# Patient Record
Sex: MALE | Race: ASIAN | NOT HISPANIC OR LATINO | URBAN - METROPOLITAN AREA
[De-identification: names, ages, dates, MRNs, and addresses within clinical notes are randomized per-mention and may not be internally consistent; named-entity substitution may affect disease eponyms.]

---

## 2024-08-03 ENCOUNTER — INPATIENT (INPATIENT)
Facility: HOSPITAL | Age: 79
LOS: 2 days | Discharge: HOME | End: 2024-08-06
Attending: INTERNAL MEDICINE | Admitting: INTERNAL MEDICINE
Payer: MEDICAID

## 2024-08-03 VITALS
WEIGHT: 166.23 LBS | SYSTOLIC BLOOD PRESSURE: 106 MMHG | HEIGHT: 70 IN | TEMPERATURE: 97 F | RESPIRATION RATE: 18 BRPM | OXYGEN SATURATION: 98 % | DIASTOLIC BLOOD PRESSURE: 70 MMHG | HEART RATE: 81 BPM

## 2024-08-03 LAB
ALBUMIN SERPL ELPH-MCNC: 3.9 G/DL — SIGNIFICANT CHANGE UP (ref 3.3–5)
ALP SERPL-CCNC: 61 U/L — SIGNIFICANT CHANGE UP (ref 40–120)
ALT FLD-CCNC: 27 U/L — SIGNIFICANT CHANGE UP (ref 12–78)
AMMONIA BLD-MCNC: 30 UMOL/L — SIGNIFICANT CHANGE UP (ref 11–32)
ANION GAP SERPL CALC-SCNC: 6 MMOL/L — SIGNIFICANT CHANGE UP (ref 5–17)
APPEARANCE UR: CLEAR — SIGNIFICANT CHANGE UP
APTT BLD: 23 SEC — LOW (ref 24.5–35.6)
AST SERPL-CCNC: 14 U/L — LOW (ref 15–37)
BASOPHILS # BLD AUTO: 0.03 K/UL — SIGNIFICANT CHANGE UP (ref 0–0.2)
BASOPHILS NFR BLD AUTO: 0.4 % — SIGNIFICANT CHANGE UP (ref 0–2)
BILIRUB SERPL-MCNC: 0.3 MG/DL — SIGNIFICANT CHANGE UP (ref 0.2–1.2)
BILIRUB UR-MCNC: NEGATIVE — SIGNIFICANT CHANGE UP
BLD GP AB SCN SERPL QL: SIGNIFICANT CHANGE UP
BUN SERPL-MCNC: 13 MG/DL — SIGNIFICANT CHANGE UP (ref 7–23)
CALCIUM SERPL-MCNC: 9 MG/DL — SIGNIFICANT CHANGE UP (ref 8.5–10.1)
CHLORIDE SERPL-SCNC: 109 MMOL/L — HIGH (ref 96–108)
CO2 SERPL-SCNC: 25 MMOL/L — SIGNIFICANT CHANGE UP (ref 22–31)
COLOR SPEC: YELLOW — SIGNIFICANT CHANGE UP
CREAT SERPL-MCNC: 1.18 MG/DL — SIGNIFICANT CHANGE UP (ref 0.5–1.3)
DIFF PNL FLD: NEGATIVE — SIGNIFICANT CHANGE UP
EGFR: 62 ML/MIN/1.73M2 — SIGNIFICANT CHANGE UP
EOSINOPHIL # BLD AUTO: 0.24 K/UL — SIGNIFICANT CHANGE UP (ref 0–0.5)
EOSINOPHIL NFR BLD AUTO: 3.2 % — SIGNIFICANT CHANGE UP (ref 0–6)
ETHANOL SERPL-MCNC: <10 MG/DL — SIGNIFICANT CHANGE UP (ref 0–10)
GLUCOSE SERPL-MCNC: 126 MG/DL — HIGH (ref 70–99)
GLUCOSE UR QL: NEGATIVE MG/DL — SIGNIFICANT CHANGE UP
HCT VFR BLD CALC: 38 % — LOW (ref 39–50)
HGB BLD-MCNC: 13 G/DL — SIGNIFICANT CHANGE UP (ref 13–17)
IMM GRANULOCYTES NFR BLD AUTO: 0.1 % — SIGNIFICANT CHANGE UP (ref 0–0.9)
INR BLD: 0.85 RATIO — SIGNIFICANT CHANGE UP (ref 0.85–1.18)
KETONES UR-MCNC: NEGATIVE MG/DL — SIGNIFICANT CHANGE UP
LACTATE SERPL-SCNC: 1.2 MMOL/L — SIGNIFICANT CHANGE UP (ref 0.7–2)
LEUKOCYTE ESTERASE UR-ACNC: NEGATIVE — SIGNIFICANT CHANGE UP
LYMPHOCYTES # BLD AUTO: 3.82 K/UL — HIGH (ref 1–3.3)
LYMPHOCYTES # BLD AUTO: 51.7 % — HIGH (ref 13–44)
MCHC RBC-ENTMCNC: 29.2 PG — SIGNIFICANT CHANGE UP (ref 27–34)
MCHC RBC-ENTMCNC: 34.2 G/DL — SIGNIFICANT CHANGE UP (ref 32–36)
MCV RBC AUTO: 85.4 FL — SIGNIFICANT CHANGE UP (ref 80–100)
MONOCYTES # BLD AUTO: 0.58 K/UL — SIGNIFICANT CHANGE UP (ref 0–0.9)
MONOCYTES NFR BLD AUTO: 7.8 % — SIGNIFICANT CHANGE UP (ref 2–14)
NEUTROPHILS # BLD AUTO: 2.71 K/UL — SIGNIFICANT CHANGE UP (ref 1.8–7.4)
NEUTROPHILS NFR BLD AUTO: 36.8 % — LOW (ref 43–77)
NITRITE UR-MCNC: NEGATIVE — SIGNIFICANT CHANGE UP
NRBC # BLD: 0 /100 WBCS — SIGNIFICANT CHANGE UP (ref 0–0)
PH UR: 6 — SIGNIFICANT CHANGE UP (ref 5–8)
PLATELET # BLD AUTO: 198 K/UL — SIGNIFICANT CHANGE UP (ref 150–400)
POTASSIUM SERPL-MCNC: 3.6 MMOL/L — SIGNIFICANT CHANGE UP (ref 3.5–5.3)
POTASSIUM SERPL-SCNC: 3.6 MMOL/L — SIGNIFICANT CHANGE UP (ref 3.5–5.3)
PROT SERPL-MCNC: 7.4 GM/DL — SIGNIFICANT CHANGE UP (ref 6–8.3)
PROT UR-MCNC: NEGATIVE MG/DL — SIGNIFICANT CHANGE UP
PROTHROM AB SERPL-ACNC: 10.2 SEC — SIGNIFICANT CHANGE UP (ref 9.5–13)
RBC # BLD: 4.45 M/UL — SIGNIFICANT CHANGE UP (ref 4.2–5.8)
RBC # FLD: 13.2 % — SIGNIFICANT CHANGE UP (ref 10.3–14.5)
SODIUM SERPL-SCNC: 140 MMOL/L — SIGNIFICANT CHANGE UP (ref 135–145)
SP GR SPEC: >1.03 — HIGH (ref 1–1.03)
TROPONIN I, HIGH SENSITIVITY RESULT: 4.9 NG/L — SIGNIFICANT CHANGE UP
UROBILINOGEN FLD QL: 1 MG/DL — SIGNIFICANT CHANGE UP (ref 0.2–1)
WBC # BLD: 7.39 K/UL — SIGNIFICANT CHANGE UP (ref 3.8–10.5)
WBC # FLD AUTO: 7.39 K/UL — SIGNIFICANT CHANGE UP (ref 3.8–10.5)

## 2024-08-03 PROCEDURE — 99291 CRITICAL CARE FIRST HOUR: CPT

## 2024-08-03 PROCEDURE — 99222 1ST HOSP IP/OBS MODERATE 55: CPT

## 2024-08-03 PROCEDURE — 93010 ELECTROCARDIOGRAM REPORT: CPT

## 2024-08-03 PROCEDURE — 70496 CT ANGIOGRAPHY HEAD: CPT | Mod: 26,MC

## 2024-08-03 PROCEDURE — 0042T: CPT | Mod: MC

## 2024-08-03 PROCEDURE — 70498 CT ANGIOGRAPHY NECK: CPT | Mod: 26,MC

## 2024-08-03 RX ORDER — ACETAMINOPHEN 500 MG
650 TABLET ORAL ONCE
Refills: 0 | Status: COMPLETED | OUTPATIENT
Start: 2024-08-03 | End: 2024-08-03

## 2024-08-03 RX ORDER — BACTERIOSTATIC SODIUM CHLORIDE 0.9 %
1000 VIAL (ML) INJECTION
Refills: 0 | Status: DISCONTINUED | OUTPATIENT
Start: 2024-08-03 | End: 2024-08-04

## 2024-08-03 RX ORDER — CLOSTRIDIUM TETANI TOXOID ANTIGEN (FORMALDEHYDE INACTIVATED), CORYNEBACTERIUM DIPHTHERIAE TOXOID ANTIGEN (FORMALDEHYDE INACTIVATED), BORDETELLA PERTUSSIS TOXOID ANTIGEN (GLUTARALDEHYDE INACTIVATED), BORDETELLA PERTUSSIS FILAMENTOUS HEMAGGLUTININ ANTIGEN (FORMALDEHYDE INACTIVATED), BORDETELLA PERTUSSIS PERTACTIN ANTIGEN, AND BORDETELLA PERTUSSIS FIMBRIAE 2/3 ANTIGEN 5; 2; 2.5; 5; 3; 5 [LF]/.5ML; [LF]/.5ML; UG/.5ML; UG/.5ML; UG/.5ML; UG/.5ML
0.5 INJECTION, SUSPENSION INTRAMUSCULAR ONCE
Refills: 0 | Status: COMPLETED | OUTPATIENT
Start: 2024-08-03 | End: 2024-08-03

## 2024-08-03 RX ADMIN — CLOSTRIDIUM TETANI TOXOID ANTIGEN (FORMALDEHYDE INACTIVATED), CORYNEBACTERIUM DIPHTHERIAE TOXOID ANTIGEN (FORMALDEHYDE INACTIVATED), BORDETELLA PERTUSSIS TOXOID ANTIGEN (GLUTARALDEHYDE INACTIVATED), BORDETELLA PERTUSSIS FILAMENTOUS HEMAGGLUTININ ANTIGEN (FORMALDEHYDE INACTIVATED), BORDETELLA PERTUSSIS PERTACTIN ANTIGEN, AND BORDETELLA PERTUSSIS FIMBRIAE 2/3 ANTIGEN 0.5 MILLILITER(S): 5; 2; 2.5; 5; 3; 5 INJECTION, SUSPENSION INTRAMUSCULAR at 20:50

## 2024-08-03 RX ADMIN — Medication 60 MILLILITER(S): at 22:36

## 2024-08-03 RX ADMIN — Medication 650 MILLIGRAM(S): at 22:44

## 2024-08-03 RX ADMIN — Medication 650 MILLIGRAM(S): at 23:44

## 2024-08-03 NOTE — H&P ADULT - ASSESSMENT
78 yo male had syncopal event about 5 minutes before coming to ER.  Pt. was upright standing outside in his back yard, started to feel R sided chest pain, then collapsed to floor, unconscious for 2 minutes, no shaking.  Event witnessed by family--children at bedside provide history.   Family also state that he's been having LUE pain for a couple of days, but no numbness or weakness.  Pt. was drowsy after falling.  Pt. is still slow to respond to questions at this time, and has slightly slurred speech per family (speaking Jody), but not answering appropriately.  They note he seems weaker than usual--normally physically active, and he was gardening earlier in the day without complaints.  Pt. has a posterior HA after falling, and abrasion to back of head, as he hit his head on the ground when he fell back.  No other injuries, no other complaints at this time.        Plan:  Admit to tele for syncope eval.  EKG is NSR, no ST changes, no Q waves, all normal intervals. Trop normal, CBC and SMA-7 all WNL.     CT head and CTA no acute infarct or bleed. Glucose 126, no DM. Lactate normal at 1.2, no infections, creatinine normal at 1.18. Repeat trop in AM.     Will get TTE eval EF and r/o AS, thyroid sono, follow up to CT neck. TSH and lipid panel in AM.     Monitor on tele for 48 hours for arrhythmia.  78 yo male had syncopal event about 5 minutes before coming to ER.  Pt. was upright standing outside in his back yard, started to feel R sided chest pain, then collapsed to floor, unconscious for 2 minutes, no shaking.  Event witnessed by family--children at bedside provide history.   Family also state that he's been having LUE pain for a couple of days, but no numbness or weakness.  Pt. was drowsy after falling.  Pt. is still slow to respond to questions at this time, and has slightly slurred speech per family (speaking Jody), but not answering appropriately.  They note he seems weaker than usual--normally physically active, and he was gardening earlier in the day without complaints.  Pt. has a posterior HA after falling, and abrasion to back of head, as he hit his head on the ground when he fell back.  No other injuries, no other complaints at this time.        Plan:  Admit to tele for syncope eval.  EKG is NSR, no ST changes, no Q waves, all normal intervals. Trop normal, CBC and SMA-7 all WNL.     CT head and CTA no acute infarct or bleed. Glucose 126, no DM. Lactate normal at 1.2, no infections, creatinine normal at 1.18. Repeat trop in AM.     Will get TTE eval EF and r/o AS, thyroid sono, follow up to CT neck. TSH and lipid panel in AM.  IVF for 24 hours, orthostasis in AM.     Takes no routine meds at home.     Monitor on tele for 48 hours for arrhythmia.

## 2024-08-03 NOTE — ED ADULT NURSE NOTE - OBJECTIVE STATEMENT
79Y MPMH HLD, HTN (not controlled by medication), Jody-speaking (joined by grandchildren at bedside, used for translation) c/o syncopal episode approx. 5 minutes prior to arrival to ED. per granddaughter, pt was gardening outside and decided to go for a walk at the park, states pt began feeling lightheaded/dizzy and developed R-sided chest pain; pt collapsed shortly afterwards, hitting back of head. pt wearing turban at the time of his fall. in ED, pt appears drowsy and diaphoretic, slow to respond to questions; per family, this is not pt's baseline. abrasion noted to L posterior head, some bleeding at site. code stroke initiated to r/o stroke. nkda

## 2024-08-03 NOTE — H&P ADULT - NSHPPHYSICALEXAM_GEN_ALL_CORE
PHYSICAL EXAMINATION:  Vital Signs Last 24 Hrs  T(C): 36.3 (03 Aug 2024 19:51), Max: 36.3 (03 Aug 2024 19:51)  T(F): 97.4 (03 Aug 2024 19:51), Max: 97.4 (03 Aug 2024 19:51)  HR: 81 (03 Aug 2024 19:51) (81 - 81)  BP: 106/70 (03 Aug 2024 19:51) (106/70 - 106/70)  BP(mean): --  RR: 18 (03 Aug 2024 19:51) (18 - 18)  SpO2: 98% (03 Aug 2024 19:51) (98% - 98%)      CAPILLARY BLOOD GLUCOSE      POCT Blood Glucose.: 125 mg/dL (03 Aug 2024 19:52)      GENERAL: NAD,   ENMT: mucous membranes moist  NECK: supple, No JVD  CHEST/LUNG: clear to auscultation bilaterally; no rales, rhonchi, or wheezing b/l  HEART: normal S1, S2  ABDOMEN: BS+, soft, ND, NT   EXTREMITIES:  pulses palpable; no clubbing, cyanosis, or edema b/l LEs  NEURO: awake, alert, interactive; moves all extremities PHYSICAL EXAMINATION:  Vital Signs Last 24 Hrs  T(C): 36.3 (03 Aug 2024 19:51), Max: 36.3 (03 Aug 2024 19:51)  T(F): 97.4 (03 Aug 2024 19:51), Max: 97.4 (03 Aug 2024 19:51)  HR: 81 (03 Aug 2024 19:51) (81 - 81)  BP: 106/70 (03 Aug 2024 19:51) (106/70 - 106/70)  BP(mean): --  RR: 18 (03 Aug 2024 19:51) (18 - 18)  SpO2: 98% (03 Aug 2024 19:51) (98% - 98%)      CAPILLARY BLOOD GLUCOSE      POCT Blood Glucose.: 125 mg/dL (03 Aug 2024 19:52)      GENERAL: NAD, seen in ER, comfortable, no CP or SOB, no fevers, grandaughter at bedside.    ENMT: mucous membranes moist  NECK: supple, No JVD  CHEST/LUNG: clear to auscultation bilaterally; no rales, rhonchi, or wheezing b/l  HEART: normal S1, S2  ABDOMEN: BS+, soft, ND, NT   EXTREMITIES:  pulses palpable; no clubbing, cyanosis, or edema b/l LEs  NEURO: awake, alert, interactive; moves all extremities

## 2024-08-03 NOTE — ED PROVIDER NOTE - OBJECTIVE STATEMENT
Pt. prefers that family translate Jody at bedside:  78 yo M with syncopal event about 5 minutes before coming to ER.  Pt. was upright standing outside, started to feel R sided chest pain, then collapsed to floor, unconscious for 2 minutes, no shaking.  Event witnessed by family--children at bedside provide history.   Family also state that he's been having LUE pain for a couple of days, but no numbness or weakness.  Pt. was drowsy after falling.  Pt. is still slow to respond to questions at this time, and has slightly slurred speech per family (speaking Jody), but not answering appropriately.  They note he seems weaker than usual--normally physically active, and he was gardening earlier in the day without complaints.  Pt. has a posterior HA after falling, and abrasion to back of head, as he hit his head on the ground when he fell back.  No other injuries, no other complaints at this time.    ROS: negative for fever, cough, shortness of breath, abd pain, nausea, vomiting, diarrhea, rash, paresthesia, and weakness--all other systems reviewed are negative.   PMH: negative (family admit pt. does not follow regularly with doctors); Meds: Denies; SH: Denies smoking/drinking/drug use

## 2024-08-03 NOTE — H&P ADULT - NSHPLABSRESULTS_GEN_ALL_CORE
LABS:                        13.0   7.39  )-----------( 198      ( 03 Aug 2024 20:03 )             38.0     08-03    140  |  109<H>  |  13  ----------------------------<  126<H>  3.6   |  25  |  1.18    Ca    9.0      03 Aug 2024 20:03    TPro  7.4  /  Alb  3.9  /  TBili  0.3  /  DBili  x   /  AST  14<L>  /  ALT  27  /  AlkPhos  61  08-03    PT/INR - ( 03 Aug 2024 20:03 )   PT: 10.2 sec;   INR: 0.85 ratio         PTT - ( 03 Aug 2024 20:03 )  PTT:23.0 sec  Urinalysis Basic - ( 03 Aug 2024 20:03 )    Color: x / Appearance: x / SG: x / pH: x  Gluc: 126 mg/dL / Ketone: x  / Bili: x / Urobili: x   Blood: x / Protein: x / Nitrite: x   Leuk Esterase: x / RBC: x / WBC x   Sq Epi: x / Non Sq Epi: x / Bacteria: x          RADIOLOGY & ADDITIONAL TESTS:

## 2024-08-03 NOTE — ED PROVIDER NOTE - CLINICAL SUMMARY MEDICAL DECISION MAKING FREE TEXT BOX
78 yo M with R sided chest pain, syncopal event, head trauma with posterior head abrasion, no focal weakness at this time to suggest stroke, doubt PE, ACS is a concern but less likely given right sided pain, no other evidence for bodily injury  -cbc, cmp, coags, ammonia, ua/cx, dimer, lactate, lipid profile, trop, type and screen, CTA head/neck, CT brain, EKG, IV, monitor, adacel booster for abrasion  -f/u results, reeval

## 2024-08-03 NOTE — H&P ADULT - HISTORY OF PRESENT ILLNESS
Pt. prefers that family translate Jody at bedside:    78 yo male had syncopal event about 5 minutes before coming to ER.  Pt. was upright standing outside, started to feel R sided chest pain, then collapsed to floor, unconscious for 2 minutes, no shaking.  Event witnessed by family--children at bedside provide history.   Family also state that he's been having LUE pain for a couple of days, but no numbness or weakness.  Pt. was drowsy after falling.  Pt. is still slow to respond to questions at this time, and has slightly slurred speech per family (speaking Jody), but not answering appropriately.  They note he seems weaker than usual--normally physically active, and he was gardening earlier in the day without complaints.  Pt. has a posterior HA after falling, and abrasion to back of head, as he hit his head on the ground when he fell back.  No other injuries, no other complaints at this time.   Pt. prefers that family translate Jody at bedside: Granddaughter was at bedside.     80 yo male had syncopal event about 5 minutes before coming to ER.  Pt. was upright standing outside in his back yard, started to feel R sided chest pain, then collapsed to floor, unconscious for 2 minutes, no shaking.  Event witnessed by family--children at bedside provide history.   Family also state that he's been having LUE pain for a couple of days, but no numbness or weakness.  Pt. was drowsy after falling.  Pt. is still slow to respond to questions at this time, and has slightly slurred speech per family (speaking Jody), but not answering appropriately.  They note he seems weaker than usual--normally physically active, and he was gardening earlier in the day without complaints.  Pt. has a posterior HA after falling, and abrasion to back of head, as he hit his head on the ground when he fell back.  No other injuries, no other complaints at this time.

## 2024-08-03 NOTE — ED PROVIDER NOTE - PROGRESS NOTE DETAILS
pt. comfortable, more alert, labs and imaging unremarkable thus far--age-adjusted dimer is negative and pt. has no sob to suggest PE at this time  will admit for syncope, endorsed to Dr. Becerril for admission

## 2024-08-03 NOTE — ED ADULT NURSE NOTE - NSFALLRISKINTERV_ED_ALL_ED
Assistance OOB with selected safe patient handling equipment if applicable/Communicate fall risk and risk factors to all staff, patient, and family/Orthostatic vital signs/Provide visual cue: yellow wristband, yellow gown, etc/Reinforce activity limits and safety measures with patient and family/Call bell, personal items and telephone in reach/Instruct patient to call for assistance before getting out of bed/chair/stretcher/Non-slip footwear applied when patient is off stretcher/[place] to call system/Physically safe environment - no spills, clutter or unnecessary equipment/Purposeful Proactive Rounding/Room/bathroom lighting operational, light cord in reach

## 2024-08-03 NOTE — ED ADULT NURSE NOTE - ED STAT RN HANDOFF DETAILS
report given to Greta MONTILLA. pt sleeping at this time, rise and fall of chest observed. joined by grandson at bedside. belongings secured with family members. pt with NS infusing via 20G R AC access, infusing well/tolerated well. VSS, no acute distress noted.

## 2024-08-03 NOTE — ED PROVIDER NOTE - PHYSICAL EXAMINATION
Vitals: WNL  Gen: AAOx3, NAD, sitting comfortably in stretcher, non-toxic, responsive to questions   Head: 2x3 cm abrasions to posterior scalp, otherwise ncat, perrla, eomi b/l  Neck: supple, no lymphadenopathy, no midline deviation  Heart: rrr, no m/r/g  Lungs: CTA b/l, no rales/ronchi/wheezes  Abd: soft, nontender, non-distended, no rebound or guarding  Ext: no clubbing/cyanosis/edema  Neuro: sensation and muscle strength intact b/l, no focal weakness or sensory loss, CN2-12 intact b/l

## 2024-08-03 NOTE — ED ADULT TRIAGE NOTE - CHIEF COMPLAINT QUOTE
per family pt was standing and fell flat back and hit back of head, no noted blood on back of head. family states he was "staring into space" for appoximately 3 minutes.  family states his L-arm has been hurting x 2 days. possibly undigasnosed HTN, HLD without meds.   in triage. Dr. Horowitz in triage,  code stroke called @ 1957.

## 2024-08-04 LAB
CHOLEST SERPL-MCNC: 215 MG/DL — HIGH
CHOLEST SERPL-MCNC: 239 MG/DL — HIGH
HDLC SERPL-MCNC: 28 MG/DL — LOW
HDLC SERPL-MCNC: 29 MG/DL — LOW
LIPID PNL WITH DIRECT LDL SERPL: 157 MG/DL — HIGH
LIPID PNL WITH DIRECT LDL SERPL: 168 MG/DL — HIGH
NON HDL CHOLESTEROL: 188 MG/DL — HIGH
NON HDL CHOLESTEROL: 210 MG/DL — HIGH
TRIGL SERPL-MCNC: 166 MG/DL — HIGH
TRIGL SERPL-MCNC: 222 MG/DL — HIGH
TROPONIN I, HIGH SENSITIVITY RESULT: 10.4 NG/L — SIGNIFICANT CHANGE UP
TSH SERPL-MCNC: 2.34 UIU/ML — SIGNIFICANT CHANGE UP (ref 0.36–3.74)

## 2024-08-04 PROCEDURE — 76536 US EXAM OF HEAD AND NECK: CPT | Mod: 26

## 2024-08-04 PROCEDURE — 99232 SBSQ HOSP IP/OBS MODERATE 35: CPT

## 2024-08-04 PROCEDURE — 73030 X-RAY EXAM OF SHOULDER: CPT | Mod: 26,RT

## 2024-08-04 RX ORDER — IBUPROFEN 200 MG
400 TABLET ORAL THREE TIMES A DAY
Refills: 0 | Status: DISCONTINUED | OUTPATIENT
Start: 2024-08-04 | End: 2024-08-05

## 2024-08-04 RX ORDER — ACETAMINOPHEN 500 MG
1000 TABLET ORAL ONCE
Refills: 0 | Status: COMPLETED | OUTPATIENT
Start: 2024-08-04 | End: 2024-08-04

## 2024-08-04 RX ADMIN — Medication 400 MILLIGRAM(S): at 05:36

## 2024-08-04 RX ADMIN — Medication 1000 MILLIGRAM(S): at 06:17

## 2024-08-04 RX ADMIN — Medication 400 MILLIGRAM(S): at 18:03

## 2024-08-04 RX ADMIN — Medication 400 MILLIGRAM(S): at 19:03

## 2024-08-04 NOTE — PROGRESS NOTE ADULT - SUBJECTIVE AND OBJECTIVE BOX
INTERVAL HPI/OVERNIGHT EVENTS:  Pt seen and examined at bedside.     Allergies/Intolerance: No Known Allergies      MEDICATIONS  (STANDING):  sodium chloride 0.9%. 1000 milliLiter(s) (60 mL/Hr) IV Continuous <Continuous>    MEDICATIONS  (PRN):        ROS: all systems reviewed and wnl      PHYSICAL EXAMINATION:  Vital Signs Last 24 Hrs  T(C): 36.9 (04 Aug 2024 12:05), Max: 37.1 (04 Aug 2024 07:59)  T(F): 98.4 (04 Aug 2024 12:05), Max: 98.7 (04 Aug 2024 07:59)  HR: 65 (04 Aug 2024 12:05) (63 - 81)  BP: 131/72 (04 Aug 2024 12:05) (106/70 - 149/95)  BP(mean): --  RR: 16 (04 Aug 2024 12:05) (15 - 18)  SpO2: 99% (04 Aug 2024 12:05) (98% - 100%)    Parameters below as of 04 Aug 2024 07:59  Patient On (Oxygen Delivery Method): room air      CAPILLARY BLOOD GLUCOSE      POCT Blood Glucose.: 125 mg/dL (03 Aug 2024 19:52)        GENERAL:   NECK: supple, No JVD  CHEST/LUNG: clear to auscultation bilaterally; no rales, rhonchi, or wheezing b/l  HEART: normal S1, S2  ABDOMEN: BS+, soft, ND, NT   EXTREMITIES:  pulses palpable; no clubbing, cyanosis, or edema b/l LEs  SKIN: no rashes or lesions      LABS:                        13.0   7.39  )-----------( 198      ( 03 Aug 2024 20:03 )             38.0     08-03    140  |  109<H>  |  13  ----------------------------<  126<H>  3.6   |  25  |  1.18    Ca    9.0      03 Aug 2024 20:03    TPro  7.4  /  Alb  3.9  /  TBili  0.3  /  DBili  x   /  AST  14<L>  /  ALT  27  /  AlkPhos  61  08-03    PT/INR - ( 03 Aug 2024 20:03 )   PT: 10.2 sec;   INR: 0.85 ratio         PTT - ( 03 Aug 2024 20:03 )  PTT:23.0 sec  Urinalysis Basic - ( 03 Aug 2024 22:28 )    Color: Yellow / Appearance: Clear / SG: >1.030 / pH: x  Gluc: x / Ketone: Negative mg/dL  / Bili: Negative / Urobili: 1.0 mg/dL   Blood: x / Protein: Negative mg/dL / Nitrite: Negative   Leuk Esterase: Negative / RBC: x / WBC x   Sq Epi: x / Non Sq Epi: x / Bacteria: x             INTERVAL HPI/OVERNIGHT EVENTS:  Pt seen and examined at bedside.     Allergies/Intolerance: No Known Allergies      MEDICATIONS  (STANDING):  sodium chloride 0.9%. 1000 milliLiter(s) (60 mL/Hr) IV Continuous <Continuous>    MEDICATIONS  (PRN):        ROS: all systems reviewed and wnl      PHYSICAL EXAMINATION:  Vital Signs Last 24 Hrs  T(C): 36.9 (04 Aug 2024 12:05), Max: 37.1 (04 Aug 2024 07:59)  T(F): 98.4 (04 Aug 2024 12:05), Max: 98.7 (04 Aug 2024 07:59)  HR: 65 (04 Aug 2024 12:05) (63 - 81)  BP: 131/72 (04 Aug 2024 12:05) (106/70 - 149/95)  BP(mean): --  RR: 16 (04 Aug 2024 12:05) (15 - 18)  SpO2: 99% (04 Aug 2024 12:05) (98% - 100%)    Parameters below as of 04 Aug 2024 07:59  Patient On (Oxygen Delivery Method): room air      CAPILLARY BLOOD GLUCOSE      POCT Blood Glucose.: 125 mg/dL (03 Aug 2024 19:52)        GENERAL: mild right shoulder pain   NECK: supple, No JVD  CHEST/LUNG: clear to auscultation bilaterally; no rales, rhonchi, or wheezing b/l  HEART: normal S1, S2  ABDOMEN: BS+, soft, ND, NT   EXTREMITIES:  pulses palpable; no clubbing, cyanosis, or edema b/l LEs  SKIN: no rashes or lesions      LABS:                        13.0   7.39  )-----------( 198      ( 03 Aug 2024 20:03 )             38.0     08-03    140  |  109<H>  |  13  ----------------------------<  126<H>  3.6   |  25  |  1.18    Ca    9.0      03 Aug 2024 20:03    TPro  7.4  /  Alb  3.9  /  TBili  0.3  /  DBili  x   /  AST  14<L>  /  ALT  27  /  AlkPhos  61  08-03    PT/INR - ( 03 Aug 2024 20:03 )   PT: 10.2 sec;   INR: 0.85 ratio         PTT - ( 03 Aug 2024 20:03 )  PTT:23.0 sec  Urinalysis Basic - ( 03 Aug 2024 22:28 )    Color: Yellow / Appearance: Clear / SG: >1.030 / pH: x  Gluc: x / Ketone: Negative mg/dL  / Bili: Negative / Urobili: 1.0 mg/dL   Blood: x / Protein: Negative mg/dL / Nitrite: Negative   Leuk Esterase: Negative / RBC: x / WBC x   Sq Epi: x / Non Sq Epi: x / Bacteria: x

## 2024-08-04 NOTE — PROGRESS NOTE ADULT - ASSESSMENT
78 yo male had syncopal event about 5 minutes before coming to ER.  Pt. was upright standing outside in his back yard, started to feel R sided chest pain, then collapsed to floor, unconscious for 2 minutes, no shaking.  Event witnessed by family--children at bedside provide history.   Family also state that he's been having LUE pain for a couple of days, but no numbness or weakness.  Pt. was drowsy after falling.  Pt. is still slow to respond to questions at this time, and has slightly slurred speech per family (speaking Jody), but not answering appropriately.  They note he seems weaker than usual--normally physically active, and he was gardening earlier in the day without complaints.  Pt. has a posterior HA after falling, and abrasion to back of head, as he hit his head on the ground when he fell back.  No other injuries, no other complaints at this time.        Plan:  Admit to tele for syncope eval.  EKG is NSR, no ST changes, no Q waves, all normal intervals. Trop normal, CBC and SMA-7 all WNL.     CT head and CTA no acute infarct or bleed. Glucose 126, no DM. Lactate normal at 1.2, no infections, creatinine normal at 1.18. Repeat trop in AM.     Will get TTE eval EF and r/o AS, thyroid sono, follow up to CT neck. TSH and lipid panel in AM.  IVF for 24 hours, orthostasis in AM.     Takes no routine meds at home.     Monitor on tele for 48 hours for arrhythmia.  78 yo male had syncopal event about 5 minutes before coming to ER.  Pt. was upright standing outside in his back yard, started to feel R sided chest pain, then collapsed to floor, unconscious for 2 minutes, no shaking.  Event witnessed by family--children at bedside provide history.   Family also state that he's been having LUE pain for a couple of days, but no numbness or weakness.  Pt. was drowsy after falling.  Pt. is still slow to respond to questions at this time, and has slightly slurred speech per family (speaking Jody), but not answering appropriately.  They note he seems weaker than usual--normally physically active, and he was gardening earlier in the day without complaints.  Pt. has a posterior HA after falling, and abrasion to back of head, as he hit his head on the ground when he fell back.  No other injuries, no other complaints at this time.        Plan:  Admit to tele for syncope eval.  EKG is NSR, no ST changes, no Q waves, all normal intervals. Trop normal x 2. CBC and SMA-7 all WNL.     CT head and CTA no acute infarct or bleed. Glucose 126, no DM. Lactate normal at 1.2, no infections, creatinine normal at 1.18.      Will get TTE eval EF and r/o AS, thyroid sono, follow up to CT neck. TSH and lipid panel in AM.  IVF can stop.      Takes no routine meds at home.     Monitor on tele for 48 hours for arrhythmia.     Needs TTE in AM, syncope unexplained.  80 yo male had syncopal event about 5 minutes before coming to ER.  Pt. was upright standing outside in his back yard, started to feel R sided chest pain, then collapsed to floor, unconscious for 2 minutes, no shaking.  Event witnessed by family--children at bedside provide history.   Family also state that he's been having LUE pain for a couple of days, but no numbness or weakness.  Pt. was drowsy after falling.  Pt. is still slow to respond to questions at this time, and has slightly slurred speech per family (speaking Jody), but not answering appropriately.  They note he seems weaker than usual--normally physically active, and he was gardening earlier in the day without complaints.  Pt. has a posterior HA after falling, and abrasion to back of head, as he hit his head on the ground when he fell back.  No other injuries, no other complaints at this time.        Plan:  Admit to tele for syncope eval.  EKG is NSR, no ST changes, no Q waves, all normal intervals. Trop normal x 2. CBC and SMA-7 all WNL.     CT head and CTA no acute infarct or bleed. Glucose 126, no DM. Lactate normal at 1.2, no infections, creatinine normal at 1.18.      Will get TTE eval EF and r/o AS, thyroid sono, follow up to CT neck. TSH and lipid panel in AM.  IVF can stop.      Takes no routine meds at home.     Right shoulder X-ray ordered, had fall with syncope.     Motrin tid for 2 days for muscle pain after fall.      Monitor on tele for 48 hours for arrhythmia.     Needs TTE in AM, syncope unexplained.

## 2024-08-04 NOTE — PATIENT PROFILE ADULT - FALL HARM RISK - HARM RISK INTERVENTIONS

## 2024-08-04 NOTE — PATIENT PROFILE ADULT - FUNCTIONAL ASSESSMENT - BASIC MOBILITY ASSESSMENT TYPE
ONCOLOGY / HEMATOLOGY FOLLOW  UP NOTE    Ms. Debra Jordan was seen at [facility], she is a 68 year old female with:    ONCOLOGY PROBLEMS:  Patient Active Problem List   Diagnosis   • Reflux esophagitis   • Hyperlipidemia, mixed   • Dueñas's esophagus   • Class 2 severe obesity due to excess calories with serious comorbidity and body mass index (BMI) of 37.0 to 37.9 in adult (CMD)   • Post-thrombotic syndrome   • Hypertensive kidney disease with stage 3a chronic kidney disease (CMD)   • Impaired fasting glucose   • Personal history of colonic polyps   • History of deep venous thrombosis (DVT) entire venous system left lower extremity excluding greater saphenous   • Malignant neoplasm of upper-outer quadrant of right breast in female, estrogen receptor positive (CMD)   • Tubular adenoma of colon   • Postoperative visit   • Long term current use of anticoagulant   • Type 2 diabetes mellitus without complication, without long-term current use of insulin (CMD)        Chief Complaint   Patient presents with   • Breast Cancer         INTERVAL HISTORY:  Ms. Debra Jordan is here for a follow up appointment.  Since the last visit she has continued to remain active.  Patient will be leaving for [place].  She is noted worsening body aches but denies any hot flashes or night sweats.  She does perform self-breast examination once a week and has no concerns.  She does take vitamin-D daily but can not recall the dose.  Please see review of systems below and the positive findings are highlighted.    PAST MEDICAL HISTORY:    Reviewed in patient's Epic chart.    FAMILY HISTORY:  Reviewed in patient's Epic chart.    Social History     Tobacco Use   Smoking Status Never   • Passive exposure: Never   Smokeless Tobacco Never     REVIEW OF SYSTEMS  GENERAL:  Patient denies headache, fevers, chills, night sweats, excessive fatigue, change in appetite, weight loss, dizziness, but complains of: increased fatigue.  Appetite good, wt stable.   ALLERGIC/IMMUNOLOGIC: Verified allergies: Yes  EYES:  Patient denies significant visual difficulties, double vision, blurred vision  ENT/MOUTH: Patient denies problems with hearing, sore throat, sinus drainage, mouth sores  ENDOCRINE:  Patient denies diabetes, thyroid disease, hormone replacement, hot flashes  HEMATOLOGIC/LYMPHATIC: Patient denies easy bruising, bleeding, tender lymph nodes, swollen lymph nodes  BREASTS: Patient denies abnormal masses of breast, nipple discharge, pain  RESPIRATORY:  Patient denies lung pain with breathing, cough, coughing up blood, shortness of breath  CARDIOVASCULAR:  Patient denies anginal chest pain, palpitations, shortness of breath when lying flat, peripheral edema  GASTROINTESTINAL: Patient denies abdominal pain , nausea, vomiting, GI bleeding, constipation, change in bowel habits, sensation of feeling full, difficulty swallowing, but complains of: diarrhea and heartburn  : Patient denies abnormal genital masses, blood in the urine, frequency, urgency, burning with urination, hesitancy, incontinence, vaginal bleeding, discharge  MUSCULOSKELETAL:  Patient denies joint pain, bone pain, redness, decreased range of motion, but complains of: joint swelling left leg  and pt relates many \"achy joints\".   SKIN:  Patient denies chronic rashes, inflammation, ulcerations, skin changes, itching  NEUROLOGIC:  Patient denies loss of balance, areas of focal weakness, abnormal gait, sensory problems, tingling, but complains of: numbness hands at night and left leg   PSYCHIATRIC: Patient denies depression, anxiety, but complains of: insomnia-recently started Trazadone.     PHYSICAL EXAMINATION:    Debra Jordan is a 68 year old female who is alert, oriented times 3, in no apparent distress.  VITALS:    Visit Vitals  /74   Pulse (!) 60   Temp 97.9 °F (36.6 °C) (Oral)   Resp 18   Wt 93.9 kg (207 lb) Comment: last wt 207   LMP 01/01/2006 (Approximate)   SpO2  96%   BMI 37.86 kg/m²      ECO  HEENT:  Anicteric sclera  NECK:  Supple with no cervical or supraclavicular adenopathy.  No palpable masses.  LUNGS:  Clear to auscultation bilaterally   CARDIOVASCULAR:  Regular rate and rhythm.  No S3, S4 or any murmurs.  BREASTS:  Right breast:  Right partial mastectomy with sentinel node biopsy and radiation changes.  There is area of induration in the scar with no palpable axillary lymphadenopathy.  There is no definite masses palpable.  Left breast:  Skin and nipple normal.  No palpable masses.  No axillary lymphadenopathy  ABDOMEN:  Soft, nontender, no hepatosplenomegaly.  Bowel sounds are normal.  No abnormal masses are palpable.  EXTREMITIES:  Trace edema of lower extremities.  NEUROLOGIC:  No focal deficit.  LYMPHATIC SYSTEM:  No cervical, supraclavicular or axillary lymphadenopathy.    LABS:  Recent Labs   Lab 23  1407 05/15/23  0951 22  1028   WBC 5.6 5.0 5.5   RBC 4.60 4.47 4.91   HGB 13.7 13.2 14.0   HCT 40.3 38.7 41.8   MCV 87.6 86.6 85.1    160 179   Absolute Neutrophils 3.5 3.6 3.8   Absolute Lymphocytes 1.6 1.0 1.2   Absolute Monocytes 0.4 0.3 0.3   Absolute Eosinophils  0.1 0.1 0.1   Absolute Basophils 0.0 0.0 0.1       Recent Labs   Lab 23  1407 23  0812 05/15/23  0951 23  0944 23  1033 22  1028   Glucose 148* 181* 167* 166*  --  158*   Sodium 139 140 138 139  --  138   Potassium 4.3 3.7 3.4 3.7  --  4.1   Chloride 99 104 101 99  --  101   BUN 23* 25* 17 22*  --  24*   Creatinine 1.15* 1.10* 0.95 1.21*  --  1.12*   Calcium 10.1 9.4 9.1 9.0  --  9.0   Protein, Total 7.8  --  7.0 8.0 7.7 7.7   Albumin 4.1  --  3.9 4.1 4.3 3.9   GOT/AST 13  --  13 19 14 16   Alkaline Phosphatase 118*  --  130* 143* 138* 125*   GPT 31  --  27 27 23 26   Globulin 3.7  --  3.1 3.9  --  3.8       RADIOLOGICAL DATA:  TRANSTHORACIC ECHO(TTE) COMPLETE W/ W/O IMAGING AGENT  Result Date: 2023  Final Impressions   * Normal left  ventricular systolic function. EF 65% GLS -21.5%.   * Normal left ventricular chamber size.   * Grade II diastolic dysfunction of the left ventricle (pseudonormal filling pattern).   * Normal right ventricular size and systolic function. RV GLS -25%.   * Unremarkable valvular structures.   * PASP 37 mmHg.      EXAM: MAMMO SCREENING BILATERAL W JAYLEEN  DATE OF EXAM: 9/11/2023 11:15 AM.  COMPARISON EXAMS: October 6, 2022, October 13, 2021, September 17, 2021  September 10, 2021, September 8, 2021, October 29, 2018, and October 25, 2017  DENSITY: There are scattered areas of fibroglandular density.  FINDINGS: No suspicious asymmetries or groups of microcalcifications.  Breast arterial calcifications are present. A few other scattered  benign-appearing calcifications are again seen in both breasts. Some right  upper outer breast and right axillary scarring with clips from the previous  surgery is again seen along with some diffuse right breast skin thickening  from the radiation therapy. A few small stable left upper outer breast  nodules are again seen, again most consistent with intramammary lymph  nodes. 2 left upper breast skin lesions were again marked.  IMPRESSION: No mammographic evidence for malignancy.   RECOMMENDATION: Routine mammographic screening according to [employer]  S[employer] recommendations.    BI-RADS Category 2: Benign.    ASSESSMENT:  Ms. Debra Jordan is a 68 year old female with the following hematology/oncology problems:  1. 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 Status post right partial mastectomy with sentinel lymph node biopsy 0/3 MediPort placement 10/13/2021.  Positive anterior margin  2. Adjuvant systemic therapy:  Trastuzumab with paclitaxel started 11/08/2021 with infusion reactions but able to give  paclitaxel slow.  Infusion reaction with week 2 of paclitaxel. Started on nab-paclitaxel 11/8/2021 (dose 2/12 as 2nd infusion was stopped after minimal dose given).  Nab paclitaxel 125mg/m2  tolerated well without any infusion reactions.  Dose #9 decreased to 100mg/m2 for neuropathy.  Last dose of Abraxane given 02/02/2022  3. Status post radiation therapy receiving the last treatment in [place] 04/07/2022  4. Adjuvant endocrine therapy:  Anastrozole started 04/2022.  Last trastuzumab treatment given 10/20/2022    On physical examination, there is no evidence of disease recurrence.    Discussed with patient the natural history, course and prognosis of illness.    Therapeutic options discussed and explained.  Side effects, risks and benefits, and alternatives discussed.  Patient acknowledges understanding of disease and treatment plan.    2. History of recurrent DVT:  History of DVT in 1977 during the postpartum with hypercoagulable workup negative.  Recurrent episode in 2002 left leg which was unprovoked.  Patient developed hives on Lovenox.  Patient on chronic anticoagulation with warfarin    3. Bone health:  Baseline bone density performed 02/2022 showed osteopenia with a T-score of -1.3    4. Cardio oncology:  Echocardiogram performed 10/04/2021 showed LVEF of 68%.  Patient has been seen by Cardio oncology and cleared for chemotherapy.  LVEF 59% % on 2/2022.  LVEF 58% on echocardiogram performed 05/23/2022 and per cardiology, unchanged compared to the echocardiogram from 02/2022    5. Allergy to Lovenox: Will avoid heparin flushes of MediPort because of history of hives with Lovenox    PLAN/RECOMMENDATIONS:   1. RTC in 6  months with CBC, CMP, vitamin-D  Seeme week of 4/13  2. Continue anastrozole 1 mg PO daily--patient to hold for 3 weeks to see if her body aches improve  3. Next bone density is 02/2024  4. Gabapentin 300mg q HS  5. Consider zoledronic acid but patient does have chronic renal  insufficiency    The patient is instructed to call earlier if any questions or concerns.    Dr. Georgiana Donis       Admission

## 2024-08-04 NOTE — PATIENT PROFILE ADULT - NSPROPOAPRESSUREINJURY_GEN_A_NUR
Received a message from atokore Balderas.   
no

## 2024-08-05 LAB
A1C WITH ESTIMATED AVERAGE GLUCOSE RESULT: 6.3 % — HIGH (ref 4–5.6)
ANION GAP SERPL CALC-SCNC: 4 MMOL/L — LOW (ref 5–17)
BUN SERPL-MCNC: 15 MG/DL — SIGNIFICANT CHANGE UP (ref 7–23)
CALCIUM SERPL-MCNC: 8.6 MG/DL — SIGNIFICANT CHANGE UP (ref 8.5–10.1)
CHLORIDE SERPL-SCNC: 111 MMOL/L — HIGH (ref 96–108)
CO2 SERPL-SCNC: 26 MMOL/L — SIGNIFICANT CHANGE UP (ref 22–31)
CREAT SERPL-MCNC: 0.93 MG/DL — SIGNIFICANT CHANGE UP (ref 0.5–1.3)
CULTURE RESULTS: SIGNIFICANT CHANGE UP
EGFR: 83 ML/MIN/1.73M2 — SIGNIFICANT CHANGE UP
ESTIMATED AVERAGE GLUCOSE: 134 MG/DL — HIGH (ref 68–114)
GLUCOSE SERPL-MCNC: 115 MG/DL — HIGH (ref 70–99)
HCT VFR BLD CALC: 36.6 % — LOW (ref 39–50)
HGB BLD-MCNC: 12.4 G/DL — LOW (ref 13–17)
MCHC RBC-ENTMCNC: 28.8 PG — SIGNIFICANT CHANGE UP (ref 27–34)
MCHC RBC-ENTMCNC: 33.9 G/DL — SIGNIFICANT CHANGE UP (ref 32–36)
MCV RBC AUTO: 85.1 FL — SIGNIFICANT CHANGE UP (ref 80–100)
NRBC # BLD: 0 /100 WBCS — SIGNIFICANT CHANGE UP (ref 0–0)
PLATELET # BLD AUTO: 183 K/UL — SIGNIFICANT CHANGE UP (ref 150–400)
POTASSIUM SERPL-MCNC: 3.9 MMOL/L — SIGNIFICANT CHANGE UP (ref 3.5–5.3)
POTASSIUM SERPL-SCNC: 3.9 MMOL/L — SIGNIFICANT CHANGE UP (ref 3.5–5.3)
RBC # BLD: 4.3 M/UL — SIGNIFICANT CHANGE UP (ref 4.2–5.8)
RBC # FLD: 13.3 % — SIGNIFICANT CHANGE UP (ref 10.3–14.5)
SODIUM SERPL-SCNC: 141 MMOL/L — SIGNIFICANT CHANGE UP (ref 135–145)
SPECIMEN SOURCE: SIGNIFICANT CHANGE UP
WBC # BLD: 5.5 K/UL — SIGNIFICANT CHANGE UP (ref 3.8–10.5)
WBC # FLD AUTO: 5.5 K/UL — SIGNIFICANT CHANGE UP (ref 3.8–10.5)

## 2024-08-05 PROCEDURE — 99254 IP/OBS CNSLTJ NEW/EST MOD 60: CPT

## 2024-08-05 PROCEDURE — 73070 X-RAY EXAM OF ELBOW: CPT | Mod: 26,RT

## 2024-08-05 PROCEDURE — 93306 TTE W/DOPPLER COMPLETE: CPT | Mod: 26

## 2024-08-05 PROCEDURE — 73030 X-RAY EXAM OF SHOULDER: CPT | Mod: 26,RT

## 2024-08-05 PROCEDURE — 99223 1ST HOSP IP/OBS HIGH 75: CPT

## 2024-08-05 PROCEDURE — 99232 SBSQ HOSP IP/OBS MODERATE 35: CPT

## 2024-08-05 RX ORDER — ATORVASTATIN CALCIUM 40 MG/1
20 TABLET, FILM COATED ORAL AT BEDTIME
Refills: 0 | Status: DISCONTINUED | OUTPATIENT
Start: 2024-08-05 | End: 2024-08-06

## 2024-08-05 RX ORDER — HEPARIN SODIUM 1000 [USP'U]/ML
5000 INJECTION, SOLUTION INTRAVENOUS; SUBCUTANEOUS EVERY 12 HOURS
Refills: 0 | Status: DISCONTINUED | OUTPATIENT
Start: 2024-08-05 | End: 2024-08-06

## 2024-08-05 RX ORDER — ASPIRIN 500 MG
81 TABLET ORAL DAILY
Refills: 0 | Status: DISCONTINUED | OUTPATIENT
Start: 2024-08-05 | End: 2024-08-06

## 2024-08-05 RX ORDER — ACETAMINOPHEN 500 MG
650 TABLET ORAL EVERY 6 HOURS
Refills: 0 | Status: DISCONTINUED | OUTPATIENT
Start: 2024-08-05 | End: 2024-08-06

## 2024-08-05 RX ADMIN — Medication 81 MILLIGRAM(S): at 17:23

## 2024-08-05 RX ADMIN — HEPARIN SODIUM 5000 UNIT(S): 1000 INJECTION, SOLUTION INTRAVENOUS; SUBCUTANEOUS at 17:23

## 2024-08-05 RX ADMIN — Medication 650 MILLIGRAM(S): at 21:42

## 2024-08-05 RX ADMIN — Medication 400 MILLIGRAM(S): at 13:31

## 2024-08-05 RX ADMIN — Medication 650 MILLIGRAM(S): at 22:42

## 2024-08-05 RX ADMIN — ATORVASTATIN CALCIUM 20 MILLIGRAM(S): 40 TABLET, FILM COATED ORAL at 21:35

## 2024-08-05 RX ADMIN — Medication 400 MILLIGRAM(S): at 05:20

## 2024-08-05 RX ADMIN — Medication 400 MILLIGRAM(S): at 14:30

## 2024-08-05 NOTE — PROGRESS NOTE ADULT - SUBJECTIVE AND OBJECTIVE BOX
CHIEF COMPLAINT/INTERVAL HISTORY:    Patient is a 79y old  Male who presents with a chief complaint of Syncope (05 Aug 2024 13:07)      HPI:  Pt. prefers that family translate Jody at bedside: Granddaughter was at bedside.     80 yo male had syncopal event about 5 minutes before coming to ER.  Pt. was upright standing outside in his back yard, started to feel R sided chest pain, then collapsed to floor, unconscious for 2 minutes, no shaking.  Event witnessed by family--children at bedside provide history.   Family also state that he's been having LUE pain for a couple of days, but no numbness or weakness.  Pt. was drowsy after falling.  Pt. is still slow to respond to questions at this time, and has slightly slurred speech per family (speaking Jody), but not answering appropriately.  They note he seems weaker than usual--normally physically active, and he was gardening earlier in the day without complaints.  Pt. has a posterior HA after falling, and abrasion to back of head, as he hit his head on the ground when he fell back.  No other injuries, no other complaints at this time.   (03 Aug 2024 21:30)      SUBJECTIVE & OBJECTIVE: Pt seen and examined at bedside. c/o Rt shoulder and Rt elbow pain since the fall, pain worsens on rt shoulder movt but no restrictions of ROM, denies CP/ sob, dizziness  + mild posterior HA at impact site at time fo fall.       Vital Signs Last 24 Hrs  T(C): 36.4 (05 Aug 2024 11:23), Max: 36.9 (04 Aug 2024 19:47)  T(F): 97.6 (05 Aug 2024 11:23), Max: 98.4 (04 Aug 2024 19:47)  HR: 71 (05 Aug 2024 11:23) (63 - 71)  BP: 124/73 (05 Aug 2024 11:23) (124/73 - 148/86)  BP(mean): 91 (05 Aug 2024 04:49) (91 - 107)  ABP: --  ABP(mean): --  RR: 16 (05 Aug 2024 11:23) (15 - 18)  SpO2: 97% (05 Aug 2024 11:23) (97% - 98%)    O2 Parameters below as of 04 Aug 2024 21:00  Patient On (Oxygen Delivery Method): room air              MEDICATIONS  (STANDING):  atorvastatin 20 milliGRAM(s) Oral at bedtime  ibuprofen  Tablet. 400 milliGRAM(s) Oral three times a day    MEDICATIONS  (PRN):  acetaminophen     Tablet .. 650 milliGRAM(s) Oral every 6 hours PRN Temp greater or equal to 38C (100.4F), Mild Pain (1 - 3), Moderate Pain (4 - 6)        PHYSICAL EXAM:    GENERAL: NAD,  well-developed  HEAD:  Atraumatic, Normocephalic  EYES: EOMI, PERRLA, conjunctiva and sclera clear  ENMT: Moist mucous membranes  NECK: Supple,   NERVOUS SYSTEM:  Alert & Oriented X3, Motor Strength 5/5 B/L upper and lower extremities;   CHEST/LUNG: Clear to auscultation bilaterally; No rales, rhonchi, wheezing, or rubs  HEART: Regular rate and rhythm;   ABDOMEN: Soft, Nontender,; Bowel sounds present  EXTREMITIES: no cyanosis, or edema    LABS:                        12.4   5.50  )-----------( 183      ( 05 Aug 2024 12:15 )             36.6     08-05    141  |  111<H>  |  15  ----------------------------<  115<H>  3.9   |  26  |  0.93    Ca    8.6      05 Aug 2024 12:15    TPro  7.4  /  Alb  3.9  /  TBili  0.3  /  DBili  x   /  AST  14<L>  /  ALT  27  /  AlkPhos  61  08-03    PT/INR - ( 03 Aug 2024 20:03 )   PT: 10.2 sec;   INR: 0.85 ratio         PTT - ( 03 Aug 2024 20:03 )  PTT:23.0 sec  Urinalysis Basic - ( 05 Aug 2024 12:15 )    Color: x / Appearance: x / SG: x / pH: x  Gluc: 115 mg/dL / Ketone: x  / Bili: x / Urobili: x   Blood: x / Protein: x / Nitrite: x   Leuk Esterase: x / RBC: x / WBC x   Sq Epi: x / Non Sq Epi: x / Bacteria: x    < from: Xray Shoulder 2 Views, Right (08.05.24 @ 14:50) >  IMPRESSION: No acute finding.    --- End of Report ---            Moises Jacobo MD  This document has been electronically signed. Aug  5 2024  3:11PM    < end of copied text >      CTH:/CTA  IMPRESSION:    1.  Suboptimal CTA, without gross evidence of large vessel occlusions.  2.  Mild senescent cerebral changes without acute intracranial hemorrhage.  3.  Moderate left vertebral artery origin stenosis.  4.  Mild-to-moderate bilateral C6-C7 ICA segment stenoses.  5.  Mild-to-moderate bilateral M2 MCA segment stenoses.  6.  Large left thyroid mass, recommend ultrasound.

## 2024-08-05 NOTE — PHYSICAL THERAPY INITIAL EVALUATION ADULT - PERTINENT HX OF CURRENT PROBLEM, REHAB EVAL
Per H&P, Pt is a 78 yo male had syncopal event about 5 minutes before coming to ER. Pt was upright standing outside in his back yard, started to feel R sided chest pain, then collapsed to floor, unconscious for 2 minutes, no shaking.  Event witnessed by family--children at bedside provide history. Family also state that he's been having LUE pain for a couple of days, but no numbness or weakness.  Pt. was drowsy after falling.  Pt. is still slow to respond to questions at this time, and has slightly slurred speech per family (speaking Jody), but not answering appropriately.  They note he seems weaker than usual--normally physically active, and he was gardening earlier in the day without complaints.

## 2024-08-05 NOTE — PROGRESS NOTE ADULT - SUBJECTIVE AND OBJECTIVE BOX
CARDIOLOGY CONSULTATION NOTE                                                                             RONI SOLORIO is a 78yo [facility] Male admitted for a syncopal event that occurred about 5 minutes before coming to ER.   Pt. was upright standing outside in his back yard, started to feel R sided chest pain, then collapsed to floor, unconscious for 2 minutes, no shaking.  Event witnessed by family--children at bedside provide history.   Family also state that he's been having LUE pain for a couple of days, but no numbness or weakness.  Pt. was drowsy after falling.  Pt. is still slow to respond to questions at this time, and has slightly slurred speech per family (speaking Jody), but not answering appropriately.  They note he seems weaker than usual--normally physically active, and he was gardening earlier in the day without complaints.  Pt. has a posterior HA after falling, and abrasion to back of head, as he hit his head on the ground when he fell back.  No other injuries, no other complaints at this time.     REVIEW OF SYSTEMS: -----------------------------  CONSTITUTIONAL: No fever, weight loss, or fatigue EYES: No eye pain, visual disturbances, or discharge ENMT:  No difficulty hearing, tinnitus, vertigo; No sinus or throat pain NECK: No pain or stiffness BREASTS: No pain, masses, or nipple discharge RESPIRATORY: No cough, wheezing, chills or hemoptysis; No shortness of breath CARDIOVASCULAR: See HPI GASTROINTESTINAL: No abdominal or epigastric pain. No nausea, vomiting, or hematemesis; No diarrhea or constipation. No melena or hematochezia. GENITOURINARY: No dysuria, frequency, hematuria, or incontinence NEUROLOGICAL: No headaches, memory loss, loss of strength, numbness, or tremors SKIN: No itching, burning, rashes, or lesions  LYMPH NODES: No enlarged glands ENDOCRINE: No heat or cold intolerance; No hair loss MUSCULOSKELETAL: No joint pain or swelling; No muscle, back, or extremity pain PSYCHIATRIC: No depression, anxiety, mood swings, or difficulty sleeping HEME/LYMPH: No easy bruising, or bleeding gums ALLERGY AND IMMUNOLOGIC: No hives or eczema  Home Medications:   MEDICATIONS  (STANDING): atorvastatin 20 milliGRAM(s) Oral at bedtime ibuprofen  Tablet. 400 milliGRAM(s) Oral three times a day   ALLERGIES: No Known Allergies   FAMILY HISTORY:   PHYSICAL EXAMINATION: ----------------------------- T(C): 36.4 (24 @ 11:23), Max: 36.9 (24 @ 19:47) HR: 71 (24 @ 11:23) (63 - 71) BP: 124/73 (24 @ 11:23) (124/73 - 148/86) RR: 16 (24 @ 11:23) (15 - 18) SpO2: 97% (24 @ 11:23) (97% - 98%) Wt(kg): --    Constitutional: well developed, normal appearance, well groomed, well nourished, no deformities and no acute distress.  Eyes: the conjunctiva exhibited no abnormalities and the eyelids demonstrated no xanthelasmas.  HEENT: normal oral mucosa, no oral pallor and no oral cyanosis.  Neck: normal jugular venous A waves present, normal jugular venous V waves present and no jugular venous quiroz A waves.  Pulmonary: no respiratory distress, normal respiratory rhythm and effort, no accessory muscle use and lungs were clear to auscultation bilaterally.  Cardiovascular: heart rate and rhythm were normal, normal S1 and S2 and no murmur, gallop, rub, heave or thrill are present.  Abdomen: soft, non-tender, no hepato-splenomegaly and no abdominal mass palpated.  Musculoskeletal: the gait could not be assessed..  Extremities: no clubbing of the fingernails, no localized cyanosis, no petechial hemorrhages and no ischemic changes.  Skin: normal skin color and pigmentation, no rash, no venous stasis, no skin lesions, no skin ulcer and no xanthoma was observed.  Psychiatric: oriented to person, place, and time, the affect was normal, the mood was normal and not feeling anxious.   ECG: -------    LABS:  --------   141  |  111<H>  |  15 ----------------------------<  115<H> 3.9   |  26  |  0.93  Ca    8.6      05 Aug 2024 12:15  TPro  7.4  /  Alb  3.9  /  TBili  0.3  /  DBili  x   /  AST  14<L>  /  ALT  27  /  AlkPhos  61                         12.4  5.50  )-----------( 183      ( 05 Aug 2024 12:15 )            36.6    PT/INR - ( 03 Aug 2024 20:03 )   PT: 10.2 sec;   INR: 0.85 ratio      PTT - ( 03 Aug 2024 20:03 )  PTT:23.0 sec   Chol: 215 mg/dL<H>, --, HDL: 28 mg/dL<L>, LDL: 157 mg/dL,  T mg/dL<H>   Culture Results:  <10,000 CFU/mL Normal Urogenital Mago (24 @ 22:28)   RADIOLOGY REPORTS: -----------------------------    ECHOCARDIOGRAM: --------------------------- pending   78 yo, no sign PMH Syncope - first time episode  No ACS, Trops negative TTE pending If LVEF normal, pharm stress test, can be done as o/p. Tele x 24 hours, consider o/p monitoring. Ambulate and assess for orthostasis.    CARDIOLOGY CONSULTATION NOTE                                                                             RONI SOLORIO is a 80yo [facility] Male admitted for a syncopal event that occurred about 5 minutes before coming to ER.   Pt. was upright standing outside in his back yard, started to feel R sided chest pain, then collapsed to floor, unconscious for 2 minutes, no shaking.  Event witnessed by family--children at bedside provide history.   Family also state that he's been having LUE pain for a couple of days, but no numbness or weakness.  Pt. was drowsy after falling.  Pt. is still slow to respond to questions at this time, and has slightly slurred speech per family (speaking Jody), but not answering appropriately.  They note he seems weaker than usual--normally physically active, and he was gardening earlier in the day without complaints.  Pt. has a posterior HA after falling, and abrasion to back of head, as he hit his head on the ground when he fell back.  No other injuries, no other complaints at this time.     REVIEW OF SYSTEMS: -----------------------------  CONSTITUTIONAL: No fever, weight loss, or fatigue EYES: No eye pain, visual disturbances, or discharge ENMT:  No difficulty hearing, tinnitus, vertigo; No sinus or throat pain NECK: No pain or stiffness BREASTS: No pain, masses, or nipple discharge RESPIRATORY: No cough, wheezing, chills or hemoptysis; No shortness of breath CARDIOVASCULAR: See HPI GASTROINTESTINAL: No abdominal or epigastric pain. No nausea, vomiting, or hematemesis; No diarrhea or constipation. No melena or hematochezia. GENITOURINARY: No dysuria, frequency, hematuria, or incontinence NEUROLOGICAL: No headaches, memory loss, loss of strength, numbness, or tremors SKIN: No itching, burning, rashes, or lesions  LYMPH NODES: No enlarged glands ENDOCRINE: No heat or cold intolerance; No hair loss MUSCULOSKELETAL: No joint pain or swelling; No muscle, back, or extremity pain PSYCHIATRIC: No depression, anxiety, mood swings, or difficulty sleeping HEME/LYMPH: No easy bruising, or bleeding gums ALLERGY AND IMMUNOLOGIC: No hives or eczema  Home Medications:   MEDICATIONS  (STANDING): atorvastatin 20 milliGRAM(s) Oral at bedtime ibuprofen  Tablet. 400 milliGRAM(s) Oral three times a day   ALLERGIES: No Known Allergies   FAMILY HISTORY:   PHYSICAL EXAMINATION: ----------------------------- T(C): 36.4 (24 @ 11:23), Max: 36.9 (24 @ 19:47) HR: 71 (24 @ 11:23) (63 - 71) BP: 124/73 (24 @ 11:23) (124/73 - 148/86) RR: 16 (24 @ 11:23) (15 - 18) SpO2: 97% (24 @ 11:23) (97% - 98%) Wt(kg): --    Constitutional: well developed, normal appearance, well groomed, well nourished, no deformities and no acute distress.  Eyes: the conjunctiva exhibited no abnormalities and the eyelids demonstrated no xanthelasmas.  HEENT: normal oral mucosa, no oral pallor and no oral cyanosis.  Neck: normal jugular venous A waves present, normal jugular venous V waves present and no jugular venous quiroz A waves. Left thyroid fullness? Pulmonary: no respiratory distress, normal respiratory rhythm and effort, no accessory muscle use and lungs were clear to auscultation bilaterally.  Cardiovascular: heart rate and rhythm were normal, normal S1 and S2 and no murmur, gallop, rub, heave or thrill are present.  Abdomen: soft, non-tender, no hepato-splenomegaly and no abdominal mass palpated.  Musculoskeletal: the gait could not be assessed..  Extremities: no clubbing of the fingernails, no localized cyanosis, no petechial hemorrhages and no ischemic changes.  Skin: normal skin color and pigmentation, no rash, no venous stasis, no skin lesions, no skin ulcer and no xanthoma was observed.  Psychiatric: oriented to person, place, and time, the affect was normal, the mood was normal and not feeling anxious.   ECG: -------  < from: 12 Lead ECG (24 @ 19:59) >  Ventricular Rate 64 BPM  Atrial Rate 64 BPM  P-R Interval 186 ms  QRS Duration 80 ms  Q-T Interval 402 ms  QTC Calculation(Bazett) 414 ms  P Axis 21 degrees  R Axis 14 degrees  T Axis 20 degrees  Diagnosis Line Normal sinus rhythm No previous ECGs available Confirmed by Tony Jules MD ([id number]) on 2024 3:12:48 PM  < end of copied text >   LABS:  --------   141  |  111<H>  |  15 ----------------------------<  115<H> 3.9   |  26  |  0.93  Ca    8.6      05 Aug 2024 12:15  TPro  7.4  /  Alb  3.9  /  TBili  0.3  /  DBili  x   /  AST  14<L>  /  ALT  27  /  AlkPhos  61                         12.4  5.50  )-----------( 183      ( 05 Aug 2024 12:15 )            36.6    PT/INR - ( 03 Aug 2024 20:03 )   PT: 10.2 sec;   INR: 0.85 ratio      PTT - ( 03 Aug 2024 20:03 )  PTT:23.0 sec   Chol: 215 mg/dL<H>, --, HDL: 28 mg/dL<L>, LDL: 157 mg/dL,  T mg/dL<H>   Culture Results:  <10,000 CFU/mL Normal Urogenital Mago (24 @ 22:28)   RADIOLOGY REPORTS: -----------------------------  < from: CT Angio Neck Stroke Protocol w/ IV Cont (24 @ 20:13) > 1.  Suboptimal CTA, without gross evidence of large vessel occlusions. 2.  Mild senescent cerebral changes without acute intracranial hemorrhage. 3.  Moderate left vertebral artery origin stenosis. 4.  Mild-to-moderate bilateral C6-C7 ICA segment stenoses. 5.  Mild-to-moderate bilateral M2 MCA segment stenoses. 6.  Large left thyroid mass, recommend ultrasound.  < end of copied text >   ECHOCARDIOGRAM: --------------------------- pending

## 2024-08-05 NOTE — CONSULT NOTE ADULT - ASSESSMENT
Syncopal episode; first known occurrence.    He did not have a stroke or a TIA.  Therre is no neurologic indication for antiplatelet. Rx.  There is NO ROLE for antiplatelet Rx in primary prevention of stroke (death/intracarebral hemorrhages/large GI bleeds outweigh any stroke risk reduction.           RECOMMENDATIONS    Follow-up TTE results.    Cardiac telemetry.    If no significant arrhythmia found while in hospital, would advise getting ambulatory monitoring.      Unless there is some non-neurologic medical indication for ASA, D?C ASA.    Syncopal episode; first known occurrence.    He did not have a stroke or a TIA.  Therre is no neurologic indication for antiplatelet. Rx.  There is NO ROLE for antiplatelet Rx in primary prevention of stroke (death/intracarebral hemorrhages/large GI bleeds outweigh any stroke risk reduction.           RECOMMENDATIONS    Follow-up TTE results.    Cardiac telemetry.    If no significant arrhythmia found while in hospital, would advise getting ambulatory monitoring.      Unless there is some non-neurologic medical indication for ASA, D/C ASA.     Orthostatic vital signs x2  Orthostatic readings (systolic, diastolic, heart rate) should be taken: after at least 5 minutes supine, then "immediately" upon sitting, and after 1,2,3 and 5 minutes seated.  If there is a significant drop upon sitting, or Pt has symptoms (e.g., lightheadedness), then Pt is to remain seated (or even made to lie down again, if necessary) until symptoms (if any) resolve.  After having taken readings with patient seated,  have patient stand and take readings immediately, and after 1, 2, 3 and 5 mins.  Again, if symptomatic or BP  or pulse drops significantly, you have to have patient sit down.        If the only readings taken after sitting or standing are taken after several minutes (which is how measurements are typically obtained), then neurally-mediated failure of BP/P adjustments to postural change may not be detected, giving a false sense of security.    The above steps are more readily performed by two persons acting in concert rather than by one examiner unassisted.                                                           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        Patients requiring neurological follow-up after discharge may contact one of the following offices.     [facility]  [address] [place] [address]  [phone]    [facility]  [address].  [place], [place]  [phone]    Ruthie Andrade M.D.   - Department of Neurology  [employer] [facility]     Syncopal episode; first known occurrence.    He did not have a stroke or a TIA.  There is no neurologic indication for antiplatelet. Rx.  There is NO ROLE for antiplatelet Rx in primary prevention of stroke (death/intracarebral hemorrhages/large GI bleeds outweigh any stroke risk reduction.     Pre-diabetes (newly diagnised).    Dyslipidemia (newly diagnosed).        RECOMMENDATIONS    Follow-up TTE results.    Cardiac telemetry.    If no significant arrhythmia found while in hospital, would advise getting ambulatory monitoring.      Unless there is some non-neurologic medical indication for ASA, D/C ASA.     Orthostatic vital signs x2  Orthostatic readings (systolic, diastolic, heart rate) should be taken: after at least 5 minutes supine, then "immediately" upon sitting, and after 1,2,3 and 5 minutes seated.  If there is a significant drop upon sitting, or Pt has symptoms (e.g., lightheadedness), then Pt is to remain seated (or even made to lie down again, if necessary) until symptoms (if any) resolve.  After having taken readings with patient seated,  have patient stand and take readings immediately, and after 1, 2, 3 and 5 mins.  Again, if symptomatic or BP  or pulse drops significantly, you have to have patient sit down.        If the only readings taken after sitting or standing are taken after several minutes (which is how measurements are typically obtained), then neurally-mediated failure of BP/P adjustments to postural change may not be detected, giving a false sense of security.    The above steps are more readily performed by two persons acting in concert rather than by one examiner unassisted.    He has been started on a low-dose statin regimen.      Management of pre-diabetes.                                                             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        Patients requiring neurological follow-up after discharge may contact one of the following offices.     [facility] [place]  [address] [place] [address].  [place] [address]  [phone]    [place] [id number] [facility].  [place], [place]  [phone]    Ruthie Andrade M.D.   - Department of Neurology  [employer] [facility] [employer] at [facility]     Syncopal episode; first known occurrence.    Hyporeflexia, otherwise entirely normal neurologic examination.    Advanced age, prediabetes, metabolic deficiencies can predispose to hyporeflexia.       He did not have a stroke or a TIA.  There is no neurologic indication for antiplatelet. Rx.  There is NO ROLE for antiplatelet Rx in primary prevention of stroke (death/intracarebral hemorrhages/large GI bleeds outweigh any stroke risk reduction.     Pre-diabetes (newly diagnosed).    Dyslipidemia (newly diagnosed).        RECOMMENDATIONS    Follow-up TTE results.    B12, SERUM (not RBC) folate level, methylmalonic acid+homocysteine, bitamin E and B6 levels, SPEP, RF, BERENICE, RPR.      Cardiac telemetry.    If no significant arrhythmia found while in hospital, would advise getting ambulatory monitoring.      Unless there is some non-neurologic medical indication for ASA, D/C ASA.     Orthostatic vital signs x2  Orthostatic readings (systolic, diastolic, heart rate) should be taken: after at least 5 minutes supine, then "immediately" upon sitting, and after 1,2,3 and 5 minutes seated.  If there is a significant drop upon sitting, or Pt has symptoms (e.g., lightheadedness), then Pt is to remain seated (or even made to lie down again, if necessary) until symptoms (if any) resolve.  After having taken readings with patient seated,  have patient stand and take readings immediately, and after 1, 2, 3 and 5 mins.  Again, if symptomatic or BP  or pulse drops significantly, you have to have patient sit down.        If the only readings taken after sitting or standing are taken after several minutes (which is how measurements are typically obtained), then neurally-mediated failure of BP/P adjustments to postural change may not be detected, giving a false sense of security.    The above steps are more readily performed by two persons acting in concert rather than by one examiner unassisted.    He has been started on a low-dose statin regimen.  Adjust dose PRN.      Management of pre-diabetes.                                                             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        Patients requiring neurological follow-up after discharge may contact one of the following offices.     [facility] [address]  [place], [place] [id number] [phone]    [facility]  [address] [facility].  [place], [place]  [phone]    Ruthie Andrade M.D.   - Department of Neurology  [place] [employer] [facility] [employer] at [facility]     Syncopal episode; first known occurrence.    Hyporeflexia, otherwise entirely normal neurologic examination.    Advanced age, prediabetes, metabolic deficiencies can predispose to hyporeflexia.       He did not have a stroke or a TIA.  There is no neurologic indication for antiplatelet. Rx.  There is NO ROLE for antiplatelet Rx in primary prevention of stroke (death/intracerebral hemorrhages/large GI bleeds outweigh any stroke risk reduction).     Pre-diabetes (newly diagnosed).    Dyslipidemia (newly diagnosed).        RECOMMENDATIONS    Follow-up TTE results.    B12, SERUM (not RBC) folate level, methylmalonic acid+homocysteine, bitamin E and B6 levels, SPEP, RF, BERENICE, RPR.      Cardiac telemetry.    If no significant arrhythmia found while in hospital, would advise getting ambulatory monitoring.      Unless there is some non-neurologic medical indication for ASA, D/C ASA.     Orthostatic vital signs x2  Orthostatic readings (systolic, diastolic, heart rate) should be taken: after at least 5 minutes supine, then "immediately" upon sitting, and after 1,2,3 and 5 minutes seated.  If there is a significant drop upon sitting, or Pt has symptoms (e.g., lightheadedness), then Pt is to remain seated (or even made to lie down again, if necessary) until symptoms (if any) resolve.  After having taken readings with patient seated,  have patient stand and take readings immediately, and after 1, 2, 3 and 5 mins.  Again, if symptomatic or BP  or pulse drops significantly, you have to have patient sit down.        If the only readings taken after sitting or standing are taken after several minutes (which is how measurements are typically obtained), then neurally-mediated failure of BP/P adjustments to postural change may not be detected, giving a false sense of security.    The above steps are more readily performed by two persons acting in concert rather than by one examiner unassisted.    He has been started on a low-dose statin regimen.  Adjust dose PRN.      Management of pre-diabetes.                                                             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        Patients requiring neurological follow-up after discharge may contact one of the following offices.     [facility] [address]  [place], [place] [id number] [phone]    [facility]  [address] [facility].  [place], [place]  [phone]    Ruthie Andrade M.D.   - Department of Neurology  [place] [employer] [facility] [employer] at [facility]     Syncopal episode; first known occurrence.    Hyporeflexia, otherwise entirely normal neurologic examination.    Advanced age, prediabetes, metabolic deficiencies can predispose to hyporeflexia.       He did not have a stroke or a TIA.  There is no neurologic indication for antiplatelet. Rx.  There is NO ROLE for antiplatelet Rx in primary prevention of stroke (death/intracerebral hemorrhages/large GI bleeds outweigh any stroke risk reduction).     Pre-diabetes (newly diagnosed).    Dyslipidemia (newly diagnosed).        RECOMMENDATIONS    Follow-up TTE results.    B12, SERUM (not RBC) folate level, methylmalonic acid+homocysteine, vitamin E and B6 levels, SPEP, RF, BERENICE, RPR.      Cardiac telemetry.    If no significant arrhythmia found while in hospital, would advise getting ambulatory monitoring.      Unless there is some non-neurologic medical indication for ASA, D/C ASA.     Orthostatic vital signs x2  Orthostatic readings (systolic, diastolic, heart rate) should be taken: after at least 5 minutes supine, then "immediately" upon sitting, and after 1,2,3 and 5 minutes seated.  If there is a significant drop upon sitting, or Pt has symptoms (e.g., lightheadedness), then Pt is to remain seated (or even made to lie down again, if necessary) until symptoms (if any) resolve.  After having taken readings with patient seated,  have patient stand and take readings immediately, and after 1, 2, 3 and 5 mins.  Again, if symptomatic or BP  or pulse drops significantly, you have to have patient sit down.        If the only readings taken after sitting or standing are taken after several minutes (which is how measurements are typically obtained), then neurally-mediated failure of BP/P adjustments to postural change may not be detected, giving a false sense of security.    The above steps are more readily performed by two persons acting in concert rather than by one examiner unassisted.    He has been started on a low-dose statin regimen.  Adjust dose PRN.      Management of pre-diabetes.                                                             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        Patients requiring neurological follow-up after discharge may contact one of the following offices.     [facility] [place]  [address] [place] [address].  [place], [place] [id number] [phone]    [facility]  [address].  [place], [place]  [phone]    Ruthie Andrade M.D.   - Department of Neurology  [place] [employer] [facility] [employer] at [facility]

## 2024-08-05 NOTE — PROGRESS NOTE ADULT - ASSESSMENT
The chart has been reviewed but the patient has not yet been examined.  Full note to follow. 80 yo, no significant PMH  Syncope - first time episode    No ACS, Trops negative  TTE pending  If LVEF normal, pharm stress test, can be done as o/p.  Tele x 24 hours, consider o/p monitoring.  Carotid US? r/o VBI. Need for neuro evaluation?  Ambulate and assess for orthostasis.

## 2024-08-05 NOTE — CONSULT NOTE ADULT - SUBJECTIVE AND OBJECTIVE BOX
BIB family 83/324.  History from Admission H&P    <Start of quote(s) from H&P>  "Reason for Admission: Syncope  History of Present Illness:   Pt. prefers that family translate Jody at bedside: Granddaughter was at bedside.     80 yo male had syncopal event about 5 minutes before coming to ER.  Pt. was upright standing outside in his back yard, started to feel R sided chest pain, then collapsed to floor, unconscious for 2 minutes, no shaking.  Event witnessed by family--children at bedside provide history.   Family also state that he's been having LUE pain for a couple of days, but no numbness or weakness.  Pt. was drowsy after falling.  Pt. is still slow to respond to questions at this time, and has slightly slurred speech per family (speaking Jody), but not answering appropriately.  They note he seems weaker than usual--normally physically active, and he was gardening earlier in the day without complaints.  Pt. has a posterior HA after falling, and abrasion to back of head, as he hit his head on the ground when he fell back.  No other injuries, no other complaints at this time.       Review of Systems:  Other Review of Systems: All other review of systems negative, except as noted in HPI   . . .     · Substance use	No"  <End of quote(s) from H&P>    Per Cardiology Progress Note earlier today:  "· Assessment	  80 yo, no significant PMH  Syncope - first time episode    No ACS, Trops negative  TTE pending  If LVEF normal, pharm stress test, can be done as o/p.  Tele x 24 hours, consider o/p monitoring.  Carotid US? r/o VBI. Need for neuro evaluation?  Ambulate and assess for orthostasis."    p    BIB family 83/324.  History from Admission H&P    <Start of quote(s) from H&P>  "Reason for Admission: Syncope  History of Present Illness:   Pt. prefers that family translate Jody at bedside: Granddaughter was at bedside.     78 yo male had syncopal event about 5 minutes before coming to ER.  Pt. was upright standing outside in his back yard, started to feel R sided chest pain, then collapsed to floor, unconscious for 2 minutes, no shaking.  Event witnessed by family--children at bedside provide history.   Family also state that he's been having LUE pain for a couple of days, but no numbness or weakness.  Pt. was drowsy after falling.  Pt. is still slow to respond to questions at this time, and has slightly slurred speech per family (speaking Jody), but not answering appropriately.  They note he seems weaker than usual--normally physically active, and he was gardening earlier in the day without complaints.  Pt. has a posterior HA after falling, and abrasion to back of head, as he hit his head on the ground when he fell back.  No other injuries, no other complaints at this time.       Review of Systems:  Other Review of Systems: All other review of systems negative, except as noted in HPI   . . .     · Substance use	No"  <End of quote(s) from H&P>    Per Cardiology Progress Note earlier today:  "· Assessment	  78 yo, no significant PMH  Syncope - first time episode    No ACS, Trops negative  TTE pending  If LVEF normal, pharm stress test, can be done as o/p.  Tele x 24 hours, consider o/p monitoring.  Carotid US? r/o VBI. Need for neuro evaluation?  Ambulate and assess for orthostasis."      Per radiology report of CT head, CTP brain, and CTAs head and neck:  "COMPARISON: None.    FINDINGS:        Mild generalized cerebral volume loss. There is preferential expansion of   the extra-axial spaces over the frontal convexities, a common incidental   finding. Mild nonspecific low attenuation in the periventricular and   subcortical white matter.    No acute intracranial hemorrhage. No midline shift or herniation.    Limited views of the sinuses and mastoids show mild mucosal thickening   without air-fluid levels, likely chronic. There is mild atelectasis in   the right maxillary sinus. Bilateral lens implants. Limited views of the   orbits and visualized soft tissues of the neck, face, scalp, skull base,   and calvarium are otherwise unremarkable.    Using a threshold of 30%, no rCBF defects are identified. Using a   threshold of 6s, there are no Tmax abnormalities. This would be  consistent with adequate cerebral blood flow without tissue at immediate   risk. No evidence of an acute stroke within the limitations of technique.   Small  infarcts or small emboli could be beyond the resolution   of this exam, and MRI with DWI could be of value. No evidence of a   perfusion mismatch. The time plot of the passage of the contrast bolus   appears within normal limits, without significant artifacts detected in   the arterial input function curve or during passage of venous contrast.    There is ectasia of the ascending aorta, quickly tapering to normal   caliber descending aorta. Soft plaque with moderate stenosis at the left   vertebral artery origin. There is tortuosity of the left V1 vertebral   segment.    Otherwise, the right and left ICAs, CCAs, vertebrals, subclavians, and   the brachiocephalic artery are negative. No ulcerated plaque or arterial   dissection.    Intracranially, there is a transition in caliber at the C6-C7 ICA   segments bilaterally, with mild-to-moderate narrowing, likely soft   plaque. There are also mild to moderate M2 segment stenoses bilaterally.   There is suboptimal timing of the bolus with each compromises evaluation   of the distal cerebral arteries.    No intracranial aneurysms or large vessel occlusions. No large feeding   arteries or draining veins. The ACAs, MCAs, PCAs, and carotid siphons are   otherwise negative. The basilar artery and V4 vertebral segments are   otherwise negative. Abnormalities of  vessels and distal   intracranial branches are beyond the resolution of this technique, and   catheter angiography would be more sensitive.    The dural venous sinuses are not well opacified. Degenerative changes in   the cervical spine, with osteophyticforaminal encroachment. No gross   evidence of an acute fracture, although this examination wasn't optimized   to evaluate the spine.    A large dominant left thyroid nodule is noted incidentally. Further   characterization with ultrasound is recommended to guide further   management.    IMPRESSION:    1.  Suboptimal CTA, without gross evidence of large vessel occlusions.  2.  Mild senescent cerebral changes without acute intracranial hemorrhage.  3.  Moderate left vertebral artery origin stenosis.  4.  Mild-to-moderate bilateral C6-C7 ICA segment stenoses.  5.  Mild-to-moderate bilateral M2 MCA segment stenoses.  6.  Large left thyroid mass, recommend ultrasound."      Pt had TTE this afternoon; results pending.      BIB family 83/324.  History from Admission H&P    <Start of quote(s) from H&P>  "Reason for Admission: Syncope  History of Present Illness:   Pt. prefers that family translate Jody at bedside: Granddaughter was at bedside.     78 yo male had syncopal event about 5 minutes before coming to ER.  Pt. was upright standing outside in his back yard, started to feel R sided chest pain, then collapsed to floor, unconscious for 2 minutes, no shaking.  Event witnessed by family--children at bedside provide history.   Family also state that he's been having LUE pain for a couple of days, but no numbness or weakness.  Pt. was drowsy after falling.  Pt. is still slow to respond to questions at this time, and has slightly slurred speech per family (speaking Jody), but not answering appropriately.  They note he seems weaker than usual--normally physically active, and he was gardening earlier in the day without complaints.  Pt. has a posterior HA after falling, and abrasion to back of head, as he hit his head on the ground when he fell back.  No other injuries, no other complaints at this time.       Review of Systems:  Other Review of Systems: All other review of systems negative, except as noted in HPI   . . .     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      Per Cardiology Progress Note earlier today:  "· Assessment	  78 yo, no significant PMH  Syncope - first time episode    No ACS, Trops negative  TTE pending  If LVEF normal, pharm stress test, can be done as o/p.  Tele x 24 hours, consider o/p monitoring.  Carotid US? r/o VBI. Need for neuro evaluation?  Ambulate and assess for orthostasis."      Per radiology report of CT head, CTP brain, and CTAs head and neck:  "COMPARISON: None.    FINDINGS:        Mild generalized cerebral volume loss. There is preferential expansion of   the extra-axial spaces over the frontal convexities, a common incidental   finding. Mild nonspecific low attenuation in the periventricular and   subcortical white matter.    No acute intracranial hemorrhage. No midline shift or herniation.    Limited views of the sinuses and mastoids show mild mucosal thickening   without air-fluid levels, likely chronic. There is mild atelectasis in   the right maxillary sinus. Bilateral lens implants. Limited views of the   orbits and visualized soft tissues of the neck, face, scalp, skull base,   and calvarium are otherwise unremarkable.    Using a threshold of 30%, no rCBF defects are identified. Using a   threshold of 6s, there are no Tmax abnormalities. This would be  consistent with adequate cerebral blood flow without tissue at immediate   risk. No evidence of an acute stroke within the limitations of technique.   Small  infarcts or small emboli could be beyond the resolution   of this exam, and MRI with DWI could be of value. No evidence of a   perfusion mismatch. The time plot of the passage of the contrast bolus   appears within normal limits, without significant artifacts detected in   the arterial input function curve or during passage of venous contrast.    There is ectasia of the ascending aorta, quickly tapering to normal   caliber descending aorta. Soft plaque with moderate stenosis at the left   vertebral artery origin. There is tortuosity of the left V1 vertebral   segment.    Otherwise, the right and left ICAs, CCAs, vertebrals, subclavians, and   the brachiocephalic artery are negative. No ulcerated plaque or arterial   dissection.    Intracranially, there is a transition in caliber at the C6-C7 ICA   segments bilaterally, with mild-to-moderate narrowing, likely soft   plaque. There are also mild to moderate M2 segment stenoses bilaterally.   There is suboptimal timing of the bolus with each compromises evaluation   of the distal cerebral arteries.    No intracranial aneurysms or large vessel occlusions. No large feeding   arteries or draining veins. The ACAs, MCAs, PCAs, and carotid siphons are   otherwise negative. The basilar artery and V4 vertebral segments are   otherwise negative. Abnormalities of  vessels and distal   intracranial branches are beyond the resolution of this technique, and   catheter angiography would be more sensitive.    The dural venous sinuses are not well opacified. Degenerative changes in   the cervical spine, with osteophyticforaminal encroachment. No gross   evidence of an acute fracture, although this examination wasn't optimized   to evaluate the spine.    A large dominant left thyroid nodule is noted incidentally. Further   characterization with ultrasound is recommended to guide further   management.    IMPRESSION:    1.  Suboptimal CTA, without gross evidence of large vessel occlusions.  2.  Mild senescent cerebral changes without acute intracranial hemorrhage.  3.  Moderate left vertebral artery origin stenosis.  4.  Mild-to-moderate bilateral C6-C7 ICA segment stenoses.  5.  Mild-to-moderate bilateral M2 MCA segment stenoses.  6.  Large left thyroid mass, recommend ultrasound."      Pt had TTE this afternoon; results pending.      Hgb A1c  6.3%  Chol/trig/LDL/HDL  239/222/29/168       EXAMINATION    Awake, alert.  Family members translating to/from Jody, per Pt request.  Normal comprehension, expression, articulation in Jody.      BIB family 83/324.  History from Admission H&P    <Start of quote(s) from H&P>  "Reason for Admission: Syncope  History of Present Illness:   Pt. prefers that family translate Jody at bedside: Granddaughter was at bedside.     78 yo male had syncopal event about 5 minutes before coming to ER.  Pt. was upright standing outside in his back yard, started to feel R sided chest pain, then collapsed to floor, unconscious for 2 minutes, no shaking.  Event witnessed by family--children at bedside provide history.   Family also state that he's been having LUE pain for a couple of days, but no numbness or weakness.  Pt. was drowsy after falling.  Pt. is still slow to respond to questions at this time, and has slightly slurred speech per family (speaking Jody), but not answering appropriately.  They note he seems weaker than usual--normally physically active, and he was gardening earlier in the day without complaints.  Pt. has a posterior HA after falling, and abrasion to back of head, as he hit his head on the ground when he fell back.  No other injuries, no other complaints at this time.       Review of Systems:  Other Review of Systems: All other review of systems negative, except as noted in HPI   . . .     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  Per family at bedside, Pt's son-in-law who was present whent the Pt collpased (he is not here now) did not feel a pulse and instituted chest compressions 9apparently a chest thump was not administered).      Per Cardiology Progress Note earlier today:  "· Assessment	  78 yo, no significant PMH  Syncope - first time episode    No ACS, Trops negative  TTE pending  If LVEF normal, pharm stress test, can be done as o/p.  Tele x 24 hours, consider o/p monitoring.  Carotid US? r/o VBI. Need for neuro evaluation?  Ambulate and assess for orthostasis."      Per radiology report of CT head, CTP brain, and CTAs head and neck:  "COMPARISON: None.    FINDINGS:        Mild generalized cerebral volume loss. There is preferential expansion of   the extra-axial spaces over the frontal convexities, a common incidental   finding. Mild nonspecific low attenuation in the periventricular and   subcortical white matter.    No acute intracranial hemorrhage. No midline shift or herniation.    Limited views of the sinuses and mastoids show mild mucosal thickening   without air-fluid levels, likely chronic. There is mild atelectasis in   the right maxillary sinus. Bilateral lens implants. Limited views of the   orbits and visualized soft tissues of the neck, face, scalp, skull base,   and calvarium are otherwise unremarkable.    Using a threshold of 30%, no rCBF defects are identified. Using a   threshold of 6s, there are no Tmax abnormalities. This would be  consistent with adequate cerebral blood flow without tissue at immediate   risk. No evidence of an acute stroke within the limitations of technique.   Small  infarcts or small emboli could be beyond the resolution   of this exam, and MRI with DWI could be of value. No evidence of a   perfusion mismatch. The time plot of the passage of the contrast bolus   appears within normal limits, without significant artifacts detected in   the arterial input function curve or during passage of venous contrast.    There is ectasia of the ascending aorta, quickly tapering to normal   caliber descending aorta. Soft plaque with moderate stenosis at the left   vertebral artery origin. There is tortuosity of the left V1 vertebral   segment.    Otherwise, the right and left ICAs, CCAs, vertebrals, subclavians, and   the brachiocephalic artery are negative. No ulcerated plaque or arterial   dissection.    Intracranially, there is a transition in caliber at the C6-C7 ICA   segments bilaterally, with mild-to-moderate narrowing, likely soft   plaque. There are also mild to moderate M2 segment stenoses bilaterally.   There is suboptimal timing of the bolus with each compromises evaluation   of the distal cerebral arteries.    No intracranial aneurysms or large vessel occlusions. No large feeding   arteries or draining veins. The ACAs, MCAs, PCAs, and carotid siphons are   otherwise negative. The basilar artery and V4 vertebral segments are   otherwise negative. Abnormalities of  vessels and distal   intracranial branches are beyond the resolution of this technique, and   catheter angiography would be more sensitive.    The dural venous sinuses are not well opacified. Degenerative changes in   the cervical spine, with osteophyticforaminal encroachment. No gross   evidence of an acute fracture, although this examination wasn't optimized   to evaluate the spine.    A large dominant left thyroid nodule is noted incidentally. Further   characterization with ultrasound is recommended to guide further   management.    IMPRESSION:    1.  Suboptimal CTA, without gross evidence of large vessel occlusions.  2.  Mild senescent cerebral changes without acute intracranial hemorrhage.  3.  Moderate left vertebral artery origin stenosis.  4.  Mild-to-moderate bilateral C6-C7 ICA segment stenoses.  5.  Mild-to-moderate bilateral M2 MCA segment stenoses.  6.  Large left thyroid mass, recommend ultrasound."      Pt had TTE this afternoon; results pending.      Hgb A1c  6.3%  Chol/trig/LDL/HDL  239/222/29/168       EXAMINATION    Awake, alert.  Family members translating to/from [facility], per Pt request.  Follows visual cues fairly well for the examination (does not always need verbal instruction); follows verbal  instructions well.  Normal comprehension, expression, articulation in [facility].  Pt answers that he is in hospital; he is here because he felt dizzy and fell; family helped him to get up.  Gives date as August 5; does not know the year (this appears to be normal for him).      PERRL; meitotic; S/P cataract surgery OU; confrontation visual fields intact; EOMs conjugate, full; mildly saccadic pursuit.  Normal facial/lingual movements.  No UE drift.  Ubaldo symmetric and WNL four limbs.  Normal symmetric limb motor function on confrontation testing.  No UE drift.  FNF eyes open/closed intact.  Gets up normally form chair.  Gait, walking on toes/heels normal.      Reflex                           Right    Left   Comment    Biceps                              0        0  Triceps                             0        0  Patellar                            2-       2-  Gastroc                            0        0  Plantar                          mute   mute      BIB family 83/324.  History from Admission H&P    <Start of quote(s) from H&P>  "Reason for Admission: Syncope  History of Present Illness:   Pt. prefers that family translate Jody at bedside: Granddaughter was at bedside.     78 yo male had syncopal event about 5 minutes before coming to ER.  Pt. was upright standing outside in his back yard, started to feel R sided chest pain, then collapsed to floor, unconscious for 2 minutes, no shaking.  Event witnessed by family--children at bedside provide history.   Family also state that he's been having LUE pain for a couple of days, but no numbness or weakness.  Pt. was drowsy after falling.  Pt. is still slow to respond to questions at this time, and has slightly slurred speech per family (speaking Jody), but not answering appropriately.  They note he seems weaker than usual--normally physically active, and he was gardening earlier in the day without complaints.  Pt. has a posterior HA after falling, and abrasion to back of head, as he hit his head on the ground when he fell back.  No other injuries, no other complaints at this time.       Review of Systems:  Other Review of Systems: All other review of systems negative, except as noted in HPI   . . .     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  Per family at bedside, Pt's son-in-law who was present when the Pt collapsed (he is not here now) did not feel a pulse and instituted chest compressions (apparently a chest thump was not administered).      Per Cardiology Progress Note earlier today:  "· Assessment	  78 yo, no significant PMH  Syncope - first time episode    No ACS, Trops negative  TTE pending  If LVEF normal, pharm stress test, can be done as o/p.  Tele x 24 hours, consider o/p monitoring.  Carotid US? r/o VBI. Need for neuro evaluation?  Ambulate and assess for orthostasis."      Per radiology report of CT head, CTP brain, and CTAs head and neck:  "COMPARISON: None.    FINDINGS:        Mild generalized cerebral volume loss. There is preferential expansion of   the extra-axial spaces over the frontal convexities, a common incidental   finding. Mild nonspecific low attenuation in the periventricular and   subcortical white matter.    No acute intracranial hemorrhage. No midline shift or herniation.    Limited views of the sinuses and mastoids show mild mucosal thickening   without air-fluid levels, likely chronic. There is mild atelectasis in   the right maxillary sinus. Bilateral lens implants. Limited views of the   orbits and visualized soft tissues of the neck, face, scalp, skull base,   and calvarium are otherwise unremarkable.    Using a threshold of 30%, no rCBF defects are identified. Using a   threshold of 6s, there are no Tmax abnormalities. This would be  consistent with adequate cerebral blood flow without tissue at immediate   risk. No evidence of an acute stroke within the limitations of technique.   Small  infarcts or small emboli could be beyond the resolution   of this exam, and MRI with DWI could be of value. No evidence of a   perfusion mismatch. The time plot of the passage of the contrast bolus   appears within normal limits, without significant artifacts detected in   the arterial input function curve or during passage of venous contrast.    There is ectasia of the ascending aorta, quickly tapering to normal   caliber descending aorta. Soft plaque with moderate stenosis at the left   vertebral artery origin. There is tortuosity of the left V1 vertebral   segment.    Otherwise, the right and left ICAs, CCAs, vertebrals, subclavians, and   the brachiocephalic artery are negative. No ulcerated plaque or arterial   dissection.    Intracranially, there is a transition in caliber at the C6-C7 ICA   segments bilaterally, with mild-to-moderate narrowing, likely soft   plaque. There are also mild to moderate M2 segment stenoses bilaterally.   There is suboptimal timing of the bolus with each compromises evaluation   of the distal cerebral arteries.    No intracranial aneurysms or large vessel occlusions. No large feeding   arteries or draining veins. The ACAs, MCAs, PCAs, and carotid siphons are   otherwise negative. The basilar artery and V4 vertebral segments are   otherwise negative. Abnormalities of  vessels and distal   intracranial branches are beyond the resolution of this technique, and   catheter angiography would be more sensitive.    The dural venous sinuses are not well opacified. Degenerative changes in   the cervical spine, with osteophyticforaminal encroachment. No gross   evidence of an acute fracture, although this examination wasn't optimized   to evaluate the spine.    A large dominant left thyroid nodule is noted incidentally. Further   characterization with ultrasound is recommended to guide further   management.    IMPRESSION:    1.  Suboptimal CTA, without gross evidence of large vessel occlusions.  2.  Mild senescent cerebral changes without acute intracranial hemorrhage.  3.  Moderate left vertebral artery origin stenosis.  4.  Mild-to-moderate bilateral C6-C7 ICA segment stenoses.  5.  Mild-to-moderate bilateral M2 MCA segment stenoses.  6.  Large left thyroid mass, recommend ultrasound."      Pt had TTE this afternoon; results pending.      Hgb A1c  6.3%  Chol/trig/LDL/HDL  239/222/29/168       EXAMINATION    Awake, alert.  Family members translating to/from [facility], per Pt request.  Follows visual cues fairly well for the examination (does not always need verbal instruction); follows verbal  instructions well.  Normal comprehension, expression, articulation in [facility].  Pt answers that he is in hospital; he is here because he felt dizzy and fell; family helped him to get up.  Gives date as August 5; does not know the year (this appears to be normal for him).      PERRL; meiotic; S/P cataract surgery OU; confrontation visual fields intact; EOMs conjugate, full; mildly saccadic pursuit.  Normal facial/lingual movements.  No UE drift.  Ubaldo symmetric and WNL four limbs.  Normal symmetric limb motor function on confrontation testing.  No UE drift.  FNF eyes open/closed intact.  Gets up normally from chair.  Gait, walking on toes/heels normal.      Reflex                           Right    Left   Comment    Biceps                              0        0  Triceps                             0        0  Patellar                            2-       2-  Gastroc                            0        0  Plantar                          mute   mute

## 2024-08-05 NOTE — PHYSICAL THERAPY INITIAL EVALUATION ADULT - ADDITIONAL COMMENTS
Pt was fully independent prior. Pt lives with wife, daughter, daughters family, in private house with 5 steps to enter, bedroom on second floor.

## 2024-08-05 NOTE — PROGRESS NOTE ADULT - ASSESSMENT
80 yo male had syncopal event about 5 minutes before coming to ER.  Pt. was upright standing outside in his back yard, started to feel R sided chest pain, then collapsed to floor, unconscious for 2 minutes, no shaking.  Event witnessed by family--children   Family also state that he's been having LUE pain for a couple of days, but no numbness or weakness.  Pt. was drowsy after falling. pt was slow to respond to questions and had slightly slurred speech per family (speaking Jody), but not answering appropriately.  They note he seems weaker than usual--normally physically active, and he was gardening earlier in the day without complaints.  Pt. c/o posterior HA after falling, and abrasion to back of head, as he hit his head on the ground when he fell back.      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      Pt is not on any regular meds at home   SQH for dvt ppx    PT Eval    d/w grand daughter by bed side

## 2024-08-06 VITALS
HEART RATE: 74 BPM | OXYGEN SATURATION: 97 % | RESPIRATION RATE: 17 BRPM | TEMPERATURE: 98 F | SYSTOLIC BLOOD PRESSURE: 163 MMHG | DIASTOLIC BLOOD PRESSURE: 70 MMHG

## 2024-08-06 LAB — TSH SERPL-MCNC: 1.49 UIU/ML — SIGNIFICANT CHANGE UP (ref 0.36–3.74)

## 2024-08-06 PROCEDURE — 99239 HOSP IP/OBS DSCHRG MGMT >30: CPT

## 2024-08-06 PROCEDURE — 99233 SBSQ HOSP IP/OBS HIGH 50: CPT

## 2024-08-06 PROCEDURE — 99232 SBSQ HOSP IP/OBS MODERATE 35: CPT

## 2024-08-06 RX ORDER — AMLODIPINE BESYLATE 2.5 MG/1
1 TABLET ORAL
Qty: 30 | Refills: 0
Start: 2024-08-06

## 2024-08-06 RX ORDER — SENNOSIDES 8.6 MG/1
2 TABLET ORAL AT BEDTIME
Refills: 0 | Status: DISCONTINUED | OUTPATIENT
Start: 2024-08-06 | End: 2024-08-06

## 2024-08-06 RX ORDER — CYANOCOBALAMIN/FOLIC AC/VIT B6 2-2.5-25MG
1 TABLET ORAL
Qty: 30 | Refills: 0
Start: 2024-08-06

## 2024-08-06 RX ORDER — ATORVASTATIN CALCIUM 40 MG/1
1 TABLET, FILM COATED ORAL
Qty: 30 | Refills: 0
Start: 2024-08-06

## 2024-08-06 RX ORDER — LIDOCAINE 5% 5 G/100G
1 CREAM TOPICAL ONCE
Refills: 0 | Status: COMPLETED | OUTPATIENT
Start: 2024-08-06 | End: 2024-08-06

## 2024-08-06 RX ORDER — ENOXAPARIN SODIUM 120 MG/.8ML
40 INJECTION SUBCUTANEOUS EVERY 24 HOURS
Refills: 0 | Status: DISCONTINUED | OUTPATIENT
Start: 2024-08-06 | End: 2024-08-06

## 2024-08-06 RX ORDER — LORATADINE 10 MG
17 TABLET,DISINTEGRATING ORAL ONCE
Refills: 0 | Status: COMPLETED | OUTPATIENT
Start: 2024-08-06 | End: 2024-08-06

## 2024-08-06 RX ORDER — LIDOCAINE 5% 5 G/100G
1 CREAM TOPICAL
Qty: 14 | Refills: 0
Start: 2024-08-06

## 2024-08-06 RX ORDER — MAGNESIUM, ALUMINUM HYDROXIDE 200-225/5
30 SUSPENSION, ORAL (FINAL DOSE FORM) ORAL EVERY 4 HOURS
Refills: 0 | Status: DISCONTINUED | OUTPATIENT
Start: 2024-08-06 | End: 2024-08-06

## 2024-08-06 RX ORDER — AMLODIPINE BESYLATE 2.5 MG/1
5 TABLET ORAL DAILY
Refills: 0 | Status: DISCONTINUED | OUTPATIENT
Start: 2024-08-06 | End: 2024-08-06

## 2024-08-06 RX ADMIN — Medication 650 MILLIGRAM(S): at 11:16

## 2024-08-06 RX ADMIN — Medication 17 GRAM(S): at 15:18

## 2024-08-06 RX ADMIN — Medication 650 MILLIGRAM(S): at 12:27

## 2024-08-06 RX ADMIN — Medication 81 MILLIGRAM(S): at 11:14

## 2024-08-06 RX ADMIN — LIDOCAINE 5% 1 PATCH: 5 CREAM TOPICAL at 12:28

## 2024-08-06 RX ADMIN — Medication 30 MILLILITER(S): at 15:18

## 2024-08-06 RX ADMIN — HEPARIN SODIUM 5000 UNIT(S): 1000 INJECTION, SOLUTION INTRAVENOUS; SUBCUTANEOUS at 06:31

## 2024-08-06 NOTE — PROGRESS NOTE ADULT - SUBJECTIVE AND OBJECTIVE BOX
This is in follow-up to my initial Neurology Consult Note of yesterday.  Hx and findings as detailed therein.  Cardiology Progress Note reviewed.  One set of orthostaic vital signs was performed (8/4/24) with a physiologic mild increases in systolic/diastolic pressures upon sitting/standing from recumbency.      No orders found for the recommended lab tests.        As recommended, ASA was D/C'ed.      Per report or TTE:  "CONCLUSIONS:     1. Left ventricular systolic function is normal with an ejection   fraction of 67 % by Jon's method of disks. There are no regional wall   motion abnormalities seen.   2. Normal left ventricular diastolic function, with normal left   ventricular filling pressure.   3. Normal right ventricular cavity size and normal right ventricular   systolic function.  4. Mild mitral regurgitation.   5. Left atrium is mildly dilated.   6. Mild aortic regurgitation.   7. Mild tricuspid regurgitation.   8. No evidence of left atrial or left atrial appendage thrombus.   9. There is no evidence of a left ventricular thrombus."

## 2024-08-06 NOTE — DISCHARGE NOTE PROVIDER - NSDCMRMEDTOKEN_GEN_ALL_CORE_FT
amLODIPine 5 mg oral tablet: 1 tab(s) orally once a day  atorvastatin 20 mg oral tablet: 1 tab(s) orally once a day (at bedtime)  Multiple Vitamins oral tablet: 1 tab(s) orally once a day   amLODIPine 5 mg oral tablet: 1 tab(s) orally once a day  diclofenac 1% topical gel: Apply topically to affected area 2 times a day apply to Rt shoulder x 2 times a day as needed for pain  lidocaine 4% topical film: Apply topically to affected area once a day apply to Right shoulder daily as needed for pain  Lipitor 20 mg oral tablet: 1 tab(s) orally once a day (at bedtime)  Multiple Vitamins oral tablet: 1 tab(s) orally once a day

## 2024-08-06 NOTE — DISCHARGE NOTE PROVIDER - CARE PROVIDERS DIRECT ADDRESSES
,DirectAddress_Unknown,[email or web address],DirectAddress_Unknown,DirectAddress_Unknown,DirectAddress_Unknown

## 2024-08-06 NOTE — DISCHARGE NOTE PROVIDER - NSDCFUADDINST_GEN_ALL_CORE_FT
get blood test to check B12, SERUM (not RBC) folate level, methylmalonic acid+homocysteine, Vitamin E and B6 levels, SPEP, RF, BERENICE, RPR. as outpateint either by PCP or by Neurologist as recommended by Neurologist Dr. Gamboa.

## 2024-08-06 NOTE — PROGRESS NOTE ADULT - ASSESSMENT
Syncopal episode; first known occurrence.    Hyporeflexia, otherwise entirely normal neurologic examination.    Advanced age, prediabetes, metabolic deficiencies can predispose to hyporeflexia.       He did not have a stroke or a TIA.  There is no neurologic indication for antiplatelet. Rx.  There is NO ROLE for antiplatelet Rx in primary prevention of stroke (death/intracerebral hemorrhages/large GI bleeds outweigh any stroke risk reduction).     Pre-diabetes (newly diagnosed).    Dyslipidemia (newly diagnosed).        RECOMMENDATIONS      B12, SERUM (not RBC) folate level, methylmalonic acid+homocysteine, vitamin E and B6 levels, SPEP, RF, BERENICE, RPR.  If not done in hospital would have studies performed as out-Pt.      If no significant arrhythmia found while in hospital, would advise getting ambulatory monitoring.                                                             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        Patients requiring neurological follow-up after discharge may contact one of the following offices.     [address] [place] [address]  [phone]    [place] [address] [address].  [place], [place]  [phone]    Ruthie Andrade M.D.   - Department of Neurology  [employer] [facility]

## 2024-08-06 NOTE — DISCHARGE NOTE PROVIDER - PROVIDER TOKENS
PROVIDER:[TOKEN:[[id number]],FOLLOWUP:[1 week]],PROVIDER:[TOKEN:[[id number]],FOLLOWUP:[1 week]],PROVIDER:[TOKEN:[[id number]]],FREE:[LAST:[[facility]],PHONE:[(   )    -],FAX:[(   )    -],ADDRESS:[[place] [id number]   [address] [place] [address] PHONE # [phone]    OR  [address]  [phone]],FOLLOWUP:[1 week]],FREE:[LAST:[PCP],PHONE:[(   )    -],FAX:[(   )    -],FOLLOWUP:[1-3 days]]

## 2024-08-06 NOTE — DISCHARGE NOTE NURSING/CASE MANAGEMENT/SOCIAL WORK - NSDCPEFALRISK_GEN_ALL_CORE
For information on Fall & Injury Prevention, visit: [email or web address] OR  [email or web address] OR  https://www.cdc.gov/steadi/patient.html

## 2024-08-06 NOTE — PROGRESS NOTE ADULT - NS ATTEND AMEND GEN_ALL_CORE FT
Vertebral artery stenosis leading to vertebrobasilar insufficiency?  Neuro notes appreciated.  No significant dysrhythmia, can d/c telemetry.  Outpatient follow up for further w/u including pharm stress test and o/p monitoring.   signing off now, please reconsult as needed.

## 2024-08-06 NOTE — DISCHARGE NOTE PROVIDER - ATTENDING DISCHARGE PHYSICAL EXAMINATION:
Vital Signs Last 24 Hrs  T(C): 36.7 (06 Aug 2024 11:13), Max: 36.7 (05 Aug 2024 23:55)  T(F): 98.1 (06 Aug 2024 11:13), Max: 98.1 (06 Aug 2024 11:13)  HR: 64 (06 Aug 2024 11:13) (62 - 67)  BP: 147/74 (06 Aug 2024 11:13) (131/64 - 153/76)  BP(mean): 101 (05 Aug 2024 16:17) (101 - 101)  ABP: --  ABP(mean): --  RR: 18 (06 Aug 2024 11:13) (16 - 18)  SpO2: 97% (06 Aug 2024 11:13) (96% - 97%)    O2 Parameters below as of 06 Aug 2024 04:51  Patient On (Oxygen Delivery Method): room air    Gen: NAD, AAOx3  heent: perrla, eomi  CVS: S1, S2, reg  Lungs: CTA b/l  abd : soft, n/t, BS +  extre; no edema  neuro: Non focal, AAOx3

## 2024-08-06 NOTE — DISCHARGE NOTE PROVIDER - NSDCCPCAREPLAN_GEN_ALL_CORE_FT
PRINCIPAL DISCHARGE DIAGNOSIS  Diagnosis: Syncope  Assessment and Plan of Treatment:       SECONDARY DISCHARGE DIAGNOSES  Diagnosis: Right-sided chest wall pain  Assessment and Plan of Treatment:

## 2024-08-06 NOTE — DISCHARGE NOTE NURSING/CASE MANAGEMENT/SOCIAL WORK - NSDCVIVACCINE_GEN_ALL_CORE_FT
Tdap; 03-Aug-2024 20:50; Dwight Mueller (RN); [employer]; [id number] (Exp. Date: 01-Feb-2026); IntraMuscular; Deltoid Left.; 0.5 milliLiter(s); VIS (VIS Published: 09-May-2013, VIS Presented: 03-Aug-2024);

## 2024-08-06 NOTE — PROGRESS NOTE ADULT - SUBJECTIVE AND OBJECTIVE BOX
Patient is a 79y old  Male who presents with Syncope (05 Aug 2024 15:59)    PAST MEDICAL & SURGICAL HISTORY:    no reported medical Hx    INTERVAL HISTORY:  	  MEDICATIONS:  MEDICATIONS  (STANDING):  aspirin  chewable 81 milliGRAM(s) Oral daily  atorvastatin 20 milliGRAM(s) Oral at bedtime  heparin   Injectable 5000 Unit(s) SubCutaneous every 12 hours    MEDICATIONS  (PRN):  acetaminophen     Tablet .. 650 milliGRAM(s) Oral every 6 hours PRN Temp greater or equal to 38C (100.4F), Mild Pain (1 - 3), Moderate Pain (4 - 6)    Vitals:  T(F): 97.5 (08-06-24 @ 04:51), Max: 98 (08-05-24 @ 23:55)  HR: 63 (08-06-24 @ 04:51) (62 - 71)  BP: 135/77 (08-06-24 @ 04:51) (124/73 - 153/76)  RR: 17 (08-06-24 @ 04:51) (16 - 17)  SpO2: 97% (08-06-24 @ 04:51) (96% - 97%)    Weight (kg): 75.4 (08-03 @ 19:51)  BMI (kg/m2): 23.9 (08-03 @ 19:51)    PHYSICAL EXAM:  Neuro: Awake, responsive  CV: S1 S2 RRR  Lungs: CTABL  GI: Soft, BS +, ND, NT  Extremities: No edema    TELEMETRY: sinus    RADIOLOGY:   < from: CT Brain Stroke Protocol (08.03.24 @ 20:08) >  1.  Suboptimal CTA, without gross evidence of large vessel occlusions.  2.  Mild senescent cerebral changes without acute intracranial hemorrhage.  3.  Moderate left vertebral artery origin stenosis.  4.  Mild-to-moderate bilateral C6-C7 ICA segment stenoses.  5.  Mild-to-moderate bilateral M2 MCA segment stenoses.  6.  Large left thyroid mass, recommend ultrasound.    < end of copied text >    DIAGNOSTIC TESTING:    [x ] Echocardiogram:  < from: TTE Echo Complete w/o Contrast w/ Doppler (08.05.24 @ 15:25) >   1. Left ventricular systolic function is normal with an ejection   fraction of 67 % by Jon's method of disks. There are no regional wall   motion abnormalities seen.   2. Normal left ventricular diastolic function, with normal left   ventricular filling pressure.   3. Normal right ventricular cavity size and normal right ventricular   systolic function.  4. Mild mitral regurgitation.   5. Left atrium is mildly dilated.   6. Mild aortic regurgitation.   7. Mild tricuspid regurgitation.   8. No evidence of left atrial or left atrial appendage thrombus.   9. There is no evidence of a left ventricular thrombus.    < end of copied text >    LABS:	 	    CARDIAC MARKERS:  Troponin I, High Sensitivity Result: 10.4 ng/L (08-04 @ 06:30)  Troponin I, High Sensitivity Result: 4.9 ng/L (08-03 @ 20:03)    05 Aug 2024 12:15    141    |  111    |  15     ----------------------------<  115    3.9     |  26     |  0.93   03 Aug 2024 20:03    140    |  109    |  13     ----------------------------<  126    3.6     |  25     |  1.18     Ca    8.6        05 Aug 2024 12:15                        12.4   5.50  )-----------( 183      ( 05 Aug 2024 12:15 )             36.6 ,                       13.0   7.39  )-----------( 198      ( 03 Aug 2024 20:03 )             38.0     Lipid Profile: 08-04 @ 06:30  HDL Chol:              28 mg/dL  Serum Chol:            215 mg/dL  Triglycerides:         166 mg/dL    TSH: Thyroid Stimulating Hormone, Serum: 2.340 uIU/mL (08-04 @ 06:30)    INR: 0.85 ratio (08-03 @ 20:03)           Patient is a 79y old  Male who presents with Syncope (05 Aug 2024 15:59)    PAST MEDICAL & SURGICAL HISTORY:    no reported medical Hx    INTERVAL HISTORY: resting in bed in no distress, denies any chest pain or sob, no c/o dizziness   	  MEDICATIONS:  MEDICATIONS  (STANDING):  aspirin  chewable 81 milliGRAM(s) Oral daily  atorvastatin 20 milliGRAM(s) Oral at bedtime  heparin   Injectable 5000 Unit(s) SubCutaneous every 12 hours    MEDICATIONS  (PRN):  acetaminophen     Tablet .. 650 milliGRAM(s) Oral every 6 hours PRN Temp greater or equal to 38C (100.4F), Mild Pain (1 - 3), Moderate Pain (4 - 6)    Vitals:  T(F): 97.5 (08-06-24 @ 04:51), Max: 98 (08-05-24 @ 23:55)  HR: 63 (08-06-24 @ 04:51) (62 - 71)  BP: 135/77 (08-06-24 @ 04:51) (124/73 - 153/76)  RR: 17 (08-06-24 @ 04:51) (16 - 17)  SpO2: 97% (08-06-24 @ 04:51) (96% - 97%)    Weight (kg): 75.4 (08-03 @ 19:51)  BMI (kg/m2): 23.9 (08-03 @ 19:51)    PHYSICAL EXAM:  Neuro: Awake, responsive  CV: S1 S2 RRR  Lungs: CTABL  GI: Soft, BS +, ND, NT  Extremities: No edema    TELEMETRY: sinus with few PACs    RADIOLOGY:   < from: CT Brain Stroke Protocol (08.03.24 @ 20:08) >  1.  Suboptimal CTA, without gross evidence of large vessel occlusions.  2.  Mild senescent cerebral changes without acute intracranial hemorrhage.  3.  Moderate left vertebral artery origin stenosis.  4.  Mild-to-moderate bilateral C6-C7 ICA segment stenoses.  5.  Mild-to-moderate bilateral M2 MCA segment stenoses.  6.  Large left thyroid mass, recommend ultrasound.    < end of copied text >    DIAGNOSTIC TESTING:    [x ] Echocardiogram:  < from: TTE Echo Complete w/o Contrast w/ Doppler (08.05.24 @ 15:25) >   1. Left ventricular systolic function is normal with an ejection   fraction of 67 % by Jon's method of disks. There are no regional wall   motion abnormalities seen.   2. Normal left ventricular diastolic function, with normal left   ventricular filling pressure.   3. Normal right ventricular cavity size and normal right ventricular   systolic function.  4. Mild mitral regurgitation.   5. Left atrium is mildly dilated.   6. Mild aortic regurgitation.   7. Mild tricuspid regurgitation.   8. No evidence of left atrial or left atrial appendage thrombus.   9. There is no evidence of a left ventricular thrombus.    < end of copied text >    LABS:	 	    CARDIAC MARKERS:  Troponin I, High Sensitivity Result: 10.4 ng/L (08-04 @ 06:30)  Troponin I, High Sensitivity Result: 4.9 ng/L (08-03 @ 20:03)    05 Aug 2024 12:15    141    |  111    |  15     ----------------------------<  115    3.9     |  26     |  0.93   03 Aug 2024 20:03    140    |  109    |  13     ----------------------------<  126    3.6     |  25     |  1.18     Ca    8.6        05 Aug 2024 12:15                        12.4   5.50  )-----------( 183      ( 05 Aug 2024 12:15 )             36.6 ,                       13.0   7.39  )-----------( 198      ( 03 Aug 2024 20:03 )             38.0     Lipid Profile: 08-04 @ 06:30  HDL Chol:              28 mg/dL  Serum Chol:            215 mg/dL  Triglycerides:         166 mg/dL    TSH: Thyroid Stimulating Hormone, Serum: 2.340 uIU/mL (08-04 @ 06:30)    INR: 0.85 ratio (08-03 @ 20:03)

## 2024-08-06 NOTE — DISCHARGE NOTE PROVIDER - CARE PROVIDER_API CALL
Rene Maciel  Orthopaedic Surgery  [address], [address]  [place] [address]  Phone: [phone]  Fax: [phone]  Follow Up Time: 1 week    Tony Jules  Cardiology  [address] [place] [address]  Phone: [phone]  Fax: [phone]  Follow Up Time: 1 week    Keshawn Cai  Endocrinology/Metab/Diabetes  [address], [address]  [place] [address]  Phone: [phone]  Fax: [phone]  Follow Up Time:     [facility],   [facility] [place]  [address] [place] [address]   [address] [place], [place] PHONE # [phone]    OR  [facility]  [address].  [place], [place]  [phone]  Phone: (   )    -  Fax: (   )    -  Follow Up Time: 1 week    PCP,   Phone: (   )    -  Fax: (   )    -  Follow Up Time: 1-3 days

## 2024-08-06 NOTE — CONSULT NOTE ADULT - SUBJECTIVE AND OBJECTIVE BOX
79y Male RHD presents to [facility] ED for syncope. Medicine consulted orthopedics for right shoulder pain with movement. Patient was seen and examined at bedside. Patient denies any trauma to the shoulder recently or in the past. Patient denies any falls. Denies numbness/tingling. Denies fever/chills. Denies pain/injury elsewhere. No other complaints.    HEALTH ISSUES - PROBLEM Dx:        MEDICATIONS  (STANDING):  aspirin  chewable 81 milliGRAM(s) Oral daily  atorvastatin 20 milliGRAM(s) Oral at bedtime  enoxaparin Injectable 40 milliGRAM(s) SubCutaneous every 24 hours    Allergies    No Known Allergies    Intolerances                            12.4   5.50  )-----------( 183      ( 05 Aug 2024 12:15 )             36.6     05 Aug 2024 12:15    141    |  111    |  15     ----------------------------<  115    3.9     |  26     |  0.93     Ca    8.6        05 Aug 2024 12:15        Vital Signs Last 24 Hrs  T(C): 36.7 (08-06-24 @ 11:13), Max: 36.7 (08-05-24 @ 23:55)  T(F): 98.1 (08-06-24 @ 11:13), Max: 98.1 (08-06-24 @ 11:13)  HR: 64 (08-06-24 @ 11:13) (62 - 67)  BP: 147/74 (08-06-24 @ 11:13) (131/64 - 153/76)  BP(mean): 101 (08-05-24 @ 16:17) (101 - 101)  RR: 18 (08-06-24 @ 11:13) (16 - 18)  SpO2: 97% (08-06-24 @ 11:13) (96% - 97%)  Imaging: XR demonstrates R/L proximal humerus fracture    Physical Exam  Gen: NAD, Alert and Awake, Follows Commands  Musculoskeletal:      RIGHT UE: No open skin. No obvious deformities or other signs of trauma at shoulder, upper arm, elbow, forearm, wrist or hand.  Full baseline painless ROM at shoulder, elbow, wrist, and . No bony TTP. SILT in axillary, radial, median, and ulnar distributions. 2+ radial pulse with brisk cap refill at distal finger tips. Compartments soft and compressible. Strength 5/5.      LEFT UE: No open skin. No obvious deformities or other signs of trauma at shoulder, upper arm, elbow, forearm, wrist or hand.  Full baseline painless ROM at shoulder, elbow, wrist, and . No bony TTP. SILT in axillary, radial, median, and ulnar distributions. 2+ radial pulse with brisk cap refill at distal finger tips. Compartments soft and compressible. Strength 5/5.    HIPS and PELVIS: Pelvis stable to AP and Lat compression. Able to actively SLR bilaterally. Negative Log Roll, Negative Heel strike bilaterally. No pain on internal/external rotation of the hips.    RIGHT LE: No open skin. No deformities or other signs of trauma at hip, thigh, knee, leg, ankle or foot. Full baseline painless ROM at hip, knee, ankle and toes with negative log-roll and heel strike. Able to actively SLR. SILT toes 1-5. No bony TTP to hip, knee or ankle. 2+ DP/PT pulses with brisk cap refill distally. Compartments soft and compressible. No pain on passive stretch. Strength 5/5.      LEFT LE: No open skin. No deformities  or other signs of trauma at hip, thigh, knee, leg, ankle or foot.  Full baseline painless ROM at hip, knee, ankle and toe with negative log-roll and heel strike. Able to actively SLR. SILT toes 1-5. No bony TTP to hip, knee or ankle. 2+ DP/PT pulses with brisk cap refill distally. Compartments soft and compressible. No pain on passive stretch. Strength 5/5.      A/P: 79y Male with Right shoulder pain, atraumatic.   Pain control  WBAT  PT for ROM  Ice plenty  Follow up with Dr. Medrano as outpatient 2 weeks upon discharge, call office for appointment   Ortho stable  for DC  Discussed with Dr. Maciel, who agrees with above Pt seen at bedside w son. Pt and son refused . Son translated.     79y Male RHD presents to [facility] ED for syncope. Medicine consulted orthopedics for right shoulder pain with movement. Patient was seen and examined at bedside. Patient denies any trauma to the shoulder recently or in the past. Patient denies any falls. Denies numbness/tingling. Denies fever/chills. Denies pain/injury elsewhere. No other complaints.    HEALTH ISSUES - PROBLEM Dx:        MEDICATIONS  (STANDING):  aspirin  chewable 81 milliGRAM(s) Oral daily  atorvastatin 20 milliGRAM(s) Oral at bedtime  enoxaparin Injectable 40 milliGRAM(s) SubCutaneous every 24 hours    Allergies    No Known Allergies    Intolerances                            12.4   5.50  )-----------( 183      ( 05 Aug 2024 12:15 )             36.6     05 Aug 2024 12:15    141    |  111    |  15     ----------------------------<  115    3.9     |  26     |  0.93     Ca    8.6        05 Aug 2024 12:15        Vital Signs Last 24 Hrs  T(C): 36.7 (08-06-24 @ 11:13), Max: 36.7 (08-05-24 @ 23:55)  T(F): 98.1 (08-06-24 @ 11:13), Max: 98.1 (08-06-24 @ 11:13)  HR: 64 (08-06-24 @ 11:13) (62 - 67)  BP: 147/74 (08-06-24 @ 11:13) (131/64 - 153/76)  BP(mean): 101 (08-05-24 @ 16:17) (101 - 101)  RR: 18 (08-06-24 @ 11:13) (16 - 18)  SpO2: 97% (08-06-24 @ 11:13) (96% - 97%)  Imaging: XR demonstrates R/L proximal humerus fracture    Physical Exam  Gen: NAD, Alert and Awake, Follows Commands  Musculoskeletal:      RIGHT UE: No open skin. No obvious deformities or other signs of trauma at shoulder, upper arm, elbow, forearm, wrist or hand.  Full baseline painless ROM at shoulder, elbow, wrist, and . TTP about bicipital groove and deltoid, otherwise NTTP. SILT in axillary, radial, median, and ulnar distributions. 2+ radial pulse with brisk cap refill at distal finger tips. Compartments soft and compressible. Strength 5/5. Negative empty can, drop arm, belly press, speeds, obriens.       LEFT UE: No open skin. No obvious deformities or other signs of trauma at shoulder, upper arm, elbow, forearm, wrist or hand.  Full baseline painless ROM at shoulder, elbow, wrist, and . No bony TTP. SILT in axillary, radial, median, and ulnar distributions. 2+ radial pulse with brisk cap refill at distal finger tips. Compartments soft and compressible. Strength 5/5.    HIPS and PELVIS: Pelvis stable to AP and Lat compression. Able to actively SLR bilaterally. Negative Log Roll, Negative Heel strike bilaterally. No pain on internal/external rotation of the hips.    RIGHT LE: No open skin. No deformities or other signs of trauma at hip, thigh, knee, leg, ankle or foot. Full baseline painless ROM at hip, knee, ankle and toes with negative log-roll and heel strike. Able to actively SLR. SILT toes 1-5. No bony TTP to hip, knee or ankle. 2+ DP/PT pulses with brisk cap refill distally. Compartments soft and compressible. No pain on passive stretch. Strength 5/5.      LEFT LE: No open skin. No deformities  or other signs of trauma at hip, thigh, knee, leg, ankle or foot.  Full baseline painless ROM at hip, knee, ankle and toe with negative log-roll and heel strike. Able to actively SLR. SILT toes 1-5. No bony TTP to hip, knee or ankle. 2+ DP/PT pulses with brisk cap refill distally. Compartments soft and compressible. No pain on passive stretch. Strength 5/5.      A/P: 79y Male with Right shoulder pain, atraumatic.   Pain control  WBAT  PT for ROM  Ice plenty  Follow up with Dr. Medrano as outpatient 2 weeks upon discharge, call office for appointment   Ortho stable  for DC  Discussed with Dr. Maciel, who agrees with above

## 2024-08-06 NOTE — DISCHARGE NOTE PROVIDER - NSDCFUADDAPPT_GEN_ALL_CORE_FT
Follow with cardiology Dr. Jules for outpatient stress test    Follow with Neurology and get Outpatient work up to check Vitamin levels and further work up to rule out any significant pathology    Follow with ortho for RT shoulder pain    Follow with endo for THYROID Nodules.

## 2024-08-06 NOTE — DISCHARGE NOTE PROVIDER - HOSPITAL COURSE
78 yo male had syncopal event about 5 minutes before coming to ER.  Pt. was upright standing outside in his back yard, started to feel R sided chest pain, then collapsed to floor, unconscious for 2 minutes, no shaking.  Event witnessed by family--children   Family also state that he's been having LUE pain for a couple of days, but no numbness or weakness.  Pt. was drowsy after falling. pt was slow to respond to questions and had slightly slurred speech per family (speaking Jody), but not answering appropriately.  They note he seems weaker than usual--normally physically active, and he was gardening earlier in the day without complaints.  Pt. c/o posterior HA after falling, and abrasion to back of head, as he hit his head on the ground when he fell back.      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    HTN- add low dose norvasc.     cleared for dc per neuro and cardio  pt ambulated w/ PT    family wants to take home and pt is eager to go home

## 2024-08-06 NOTE — PROGRESS NOTE ADULT - ASSESSMENT
78 yo, no significant PMH  Syncope - first time episode    No ACS, Trops negative  TTE with normal EF, no WMAs, mild MR/TR/AR  Tele x 24 hours, consider o/p monitoring. sinus with few PACs on tele review  neuro eval appreciated   no orthostasis noted   cont statin for hyperlipidemia   pharm stress test can be done as o/p.  78 yo, no significant PMH  Syncope - first time episode    No ACS, Trops negative  TTE with normal EF, no WMAs, mild MR/TR/AR  sinus with few PACs on tele review  neuro eval appreciated   no orthostasis noted   cont statin for hyperlipidemia   Outpatient follow up for further w/u including pharm stress test and o/p monitoring.   signing off now, please reconsult as needed 78 yo, no significant PMH  Syncope - first time episode    No ACS, Trops negative  TTE with normal EF, no WMAs, mild MR/TR/AR  sinus with few PACs on tele review  neuro eval appreciated   no orthostasis noted   cont statin for hyperlipidemia

## 2024-08-16 DIAGNOSIS — E78.5 HYPERLIPIDEMIA, UNSPECIFIED: ICD-10-CM

## 2024-08-16 DIAGNOSIS — R07.9 CHEST PAIN, UNSPECIFIED: ICD-10-CM

## 2024-08-16 DIAGNOSIS — R55 SYNCOPE AND COLLAPSE: ICD-10-CM

## 2024-08-16 DIAGNOSIS — Z79.82 LONG TERM (CURRENT) USE OF ASPIRIN: ICD-10-CM

## 2024-08-16 DIAGNOSIS — R29.2 ABNORMAL REFLEX: ICD-10-CM

## 2024-08-16 DIAGNOSIS — S00.91XA ABRASION OF UNSPECIFIED PART OF HEAD, INITIAL ENCOUNTER: ICD-10-CM

## 2024-08-16 DIAGNOSIS — I65.23 OCCLUSION AND STENOSIS OF BILATERAL CAROTID ARTERIES: ICD-10-CM

## 2024-08-16 DIAGNOSIS — M25.511 PAIN IN RIGHT SHOULDER: ICD-10-CM

## 2024-08-16 DIAGNOSIS — R73.03 PREDIABETES: ICD-10-CM

## 2024-08-16 DIAGNOSIS — E04.2 NONTOXIC MULTINODULAR GOITER: ICD-10-CM
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
## 2024-08-16 DIAGNOSIS — I66.03 OCCLUSION AND STENOSIS OF BILATERAL MIDDLE CEREBRAL ARTERIES: ICD-10-CM

## 2024-08-16 DIAGNOSIS — I65.02 OCCLUSION AND STENOSIS OF LEFT VERTEBRAL ARTERY: ICD-10-CM
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
## 2025-01-21 NOTE — ED ADULT TRIAGE NOTE - AS HEIGHT TYPE
stated FAMILY HISTORY:  Sibling  Still living? Unknown  Family history of diabetes mellitus, Age at diagnosis: Age Unknown

## 2025-05-05 NOTE — DISCHARGE NOTE NURSING/CASE MANAGEMENT/SOCIAL WORK - NSDCPETBCESMAN_GEN_ALL_CORE
If you are a smoker, it is important for your health to stop smoking. Please be aware that second hand smoke is also harmful. [facility]  Dr Barnett, Endocrinology Department    [address] [id number]  [place] [address]  Appointment [phone]  Fax: [phone]  [place]: Monday - Thursday

## 2025-05-28 NOTE — ED ADULT NURSE NOTE - CHPI ED NUR DURATION
today
140
What Type Of Note Output Would You Prefer (Optional)?: Standard Output
How Severe Are Your Spot(S)?: mild
Have Your Spot(S) Been Treated In The Past?: has not been treated
Hpi Title: Evaluation of Skin Lesions